# Patient Record
Sex: FEMALE | Race: BLACK OR AFRICAN AMERICAN | Employment: FULL TIME | URBAN - METROPOLITAN AREA
[De-identification: names, ages, dates, MRNs, and addresses within clinical notes are randomized per-mention and may not be internally consistent; named-entity substitution may affect disease eponyms.]

---

## 2018-08-30 ENCOUNTER — APPOINTMENT (OUTPATIENT)
Dept: ULTRASOUND IMAGING | Age: 33
End: 2018-08-30
Payer: COMMERCIAL

## 2018-08-30 ENCOUNTER — HOSPITAL ENCOUNTER (EMERGENCY)
Age: 33
Discharge: HOME | End: 2018-08-31
Payer: COMMERCIAL

## 2018-08-30 VITALS
OXYGEN SATURATION: 99 % | BODY MASS INDEX: 20.98 KG/M2 | HEIGHT: 62 IN | SYSTOLIC BLOOD PRESSURE: 123 MMHG | TEMPERATURE: 98 F | RESPIRATION RATE: 16 BRPM | DIASTOLIC BLOOD PRESSURE: 86 MMHG | WEIGHT: 114 LBS | HEART RATE: 75 BPM

## 2018-08-30 DIAGNOSIS — R10.9 ABDOMINAL PAIN IN PREGNANCY, FIRST TRIMESTER: Primary | ICD-10-CM

## 2018-08-30 DIAGNOSIS — O26.891 ABDOMINAL PAIN IN PREGNANCY, FIRST TRIMESTER: Primary | ICD-10-CM

## 2018-08-30 LAB
ALBUMIN SERPL-MCNC: 3.6 G/DL (ref 3.5–5.2)
ALP BLD-CCNC: 37 U/L (ref 35–104)
ALT SERPL-CCNC: 13 U/L (ref 0–32)
ANION GAP SERPL CALCULATED.3IONS-SCNC: 9 MMOL/L (ref 7–16)
AST SERPL-CCNC: 10 U/L (ref 0–31)
BACTERIA: ABNORMAL /HPF
BASOPHILS ABSOLUTE: 0.04 E9/L (ref 0–0.2)
BASOPHILS RELATIVE PERCENT: 0.5 % (ref 0–2)
BILIRUB SERPL-MCNC: 0.3 MG/DL (ref 0–1.2)
BILIRUBIN URINE: NEGATIVE
BLOOD, URINE: ABNORMAL
BUN BLDV-MCNC: 6 MG/DL (ref 6–20)
CALCIUM SERPL-MCNC: 8.2 MG/DL (ref 8.6–10.2)
CHLORIDE BLD-SCNC: 103 MMOL/L (ref 98–107)
CHP ED QC CHECK: YES
CLARITY: CLEAR
CO2: 24 MMOL/L (ref 22–29)
COLOR: YELLOW
CREAT SERPL-MCNC: 0.6 MG/DL (ref 0.5–1)
EOSINOPHILS ABSOLUTE: 0.09 E9/L (ref 0.05–0.5)
EOSINOPHILS RELATIVE PERCENT: 1 % (ref 0–6)
GFR AFRICAN AMERICAN: >60
GFR NON-AFRICAN AMERICAN: >60 ML/MIN/1.73
GLUCOSE BLD-MCNC: 92 MG/DL (ref 74–109)
GLUCOSE URINE: NEGATIVE MG/DL
HCT VFR BLD CALC: 34.6 % (ref 34–48)
HEMOGLOBIN: 11.7 G/DL (ref 11.5–15.5)
IMMATURE GRANULOCYTES #: 0.04 E9/L
IMMATURE GRANULOCYTES %: 0.5 % (ref 0–5)
KETONES, URINE: NEGATIVE MG/DL
LEUKOCYTE ESTERASE, URINE: NEGATIVE
LYMPHOCYTES ABSOLUTE: 3.03 E9/L (ref 1.5–4)
LYMPHOCYTES RELATIVE PERCENT: 34.4 % (ref 20–42)
MCH RBC QN AUTO: 30.7 PG (ref 26–35)
MCHC RBC AUTO-ENTMCNC: 33.8 % (ref 32–34.5)
MCV RBC AUTO: 90.8 FL (ref 80–99.9)
MONOCYTES ABSOLUTE: 0.57 E9/L (ref 0.1–0.95)
MONOCYTES RELATIVE PERCENT: 6.5 % (ref 2–12)
NEUTROPHILS ABSOLUTE: 5.03 E9/L (ref 1.8–7.3)
NEUTROPHILS RELATIVE PERCENT: 57.1 % (ref 43–80)
NITRITE, URINE: NEGATIVE
PDW BLD-RTO: 12.8 FL (ref 11.5–15)
PH UA: 6.5 (ref 5–9)
PLATELET # BLD: 177 E9/L (ref 130–450)
PMV BLD AUTO: 10.1 FL (ref 7–12)
POTASSIUM SERPL-SCNC: 4.2 MMOL/L (ref 3.5–5)
PREGNANCY TEST URINE, POC: POSITIVE
PROTEIN UA: NEGATIVE MG/DL
RBC # BLD: 3.81 E12/L (ref 3.5–5.5)
RBC UA: ABNORMAL /HPF (ref 0–2)
SODIUM BLD-SCNC: 136 MMOL/L (ref 132–146)
SPECIFIC GRAVITY UA: 1.01 (ref 1–1.03)
TOTAL PROTEIN: 6.1 G/DL (ref 6.4–8.3)
UROBILINOGEN, URINE: 0.2 E.U./DL
WBC # BLD: 8.8 E9/L (ref 4.5–11.5)
WBC UA: ABNORMAL /HPF (ref 0–5)

## 2018-08-30 PROCEDURE — 76817 TRANSVAGINAL US OBSTETRIC: CPT

## 2018-08-30 PROCEDURE — 80053 COMPREHEN METABOLIC PANEL: CPT

## 2018-08-30 PROCEDURE — 36415 COLL VENOUS BLD VENIPUNCTURE: CPT

## 2018-08-30 PROCEDURE — 81001 URINALYSIS AUTO W/SCOPE: CPT

## 2018-08-30 PROCEDURE — 2580000003 HC RX 258: Performed by: NURSE PRACTITIONER

## 2018-08-30 PROCEDURE — 85025 COMPLETE CBC W/AUTO DIFF WBC: CPT

## 2018-08-30 PROCEDURE — 99284 EMERGENCY DEPT VISIT MOD MDM: CPT

## 2018-08-30 RX ORDER — 0.9 % SODIUM CHLORIDE 0.9 %
1000 INTRAVENOUS SOLUTION INTRAVENOUS ONCE
Status: COMPLETED | OUTPATIENT
Start: 2018-08-30 | End: 2018-08-31

## 2018-08-30 RX ADMIN — SODIUM CHLORIDE 1000 ML: 9 INJECTION, SOLUTION INTRAVENOUS at 21:55

## 2018-08-30 ASSESSMENT — PAIN DESCRIPTION - LOCATION: LOCATION: ABDOMEN

## 2018-08-30 ASSESSMENT — PAIN DESCRIPTION - PAIN TYPE: TYPE: ACUTE PAIN

## 2018-08-30 ASSESSMENT — PAIN SCALES - GENERAL: PAINLEVEL_OUTOF10: 2

## 2018-08-31 NOTE — ED PROVIDER NOTES
[facility]     HPI:  8/30/18,   Time: 9:26 PM         Sun Li is a 28 y.o. female presenting to the ED for Nausea and abdominal cramping, beginning Earlier today ago. The complaint has been constant, mild in severity, and worsened by nothing. States that she is g8, p4, a3, follows with  Dr. Desiree Govea, complaining of excessive nausea and vomiting unable to keep anything down. Denies any abdominal pain or vaginal bleeding spotting, But states she does have some abdominal cramping. She has not been seen by ObGyn at this point however does have an upcoming appointment in 2 weeks. She does have Phenergan at home which she has been taken for nausea but with minimal improvement. ROS:   Pertinent positives and negatives are stated within HPI, all other systems reviewed and are negative.  --------------------------------------------- PAST HISTORY ---------------------------------------------  Past Medical History:  has no past medical history on file. Past Surgical History:  has a past surgical history that includes Dilation & curettage and Tonsillectomy and adenoidectomy. Social History:  reports that she quit smoking 8 days ago. She has never used smokeless tobacco. She reports that she does not drink alcohol or use drugs. Family History: family history includes Breast Cancer in her paternal grandmother; Hypertension in her father. The patients home medications have been reviewed. Allergies: Patient has no known allergies.     -------------------------------------------------- RESULTS -------------------------------------------------  All laboratory and radiology results have been personally reviewed by myself   LABS:  Results for orders placed or performed during the hospital encounter of 08/30/18   CBC Auto Differential   Result Value Ref Range    WBC 8.8 4.5 - 11.5 E9/L    RBC 3.81 3.50 - 5.50 E12/L    Hemoglobin 11.7 11.5 - 15.5 g/dL    Hematocrit 34.6 34.0 - 48.0 %    MCV 90.8 80.0 Final Result   1. Single, live intrauterine pregnancy with a crown-rump length compatible    with a 7 week, 3 day gestation. The mean sac diameter is compatible with an 8    week gestation. 2.  The right ovary is not visualized. 3.  No evidence of left ovarian torsion. This report has been electronically signed by Nilton Tom MD.          ------------------------- NURSING NOTES AND VITALS REVIEWED ---------------------------   The nursing notes within the ED encounter and vital signs as below have been reviewed. /86   Pulse 75   Temp 98 °F (36.7 °C)   Resp 16   Ht 5' 2\" (1.575 m)   Wt 114 lb (51.7 kg)   LMP 07/11/2018   SpO2 99%   BMI 20.85 kg/m²   Oxygen Saturation Interpretation: Normal      ---------------------------------------------------PHYSICAL EXAM--------------------------------------      Constitutional/General: Alert and oriented x3, well appearing, non toxic in NAD  Head: NC/AT  Eyes: PERRL, EOMI  Mouth: Oropharynx clear, handling secretions, no trismus  Neck: Supple, full ROM, no meningeal signs  Pulmonary: Lungs clear to auscultation bilaterally, no wheezes, rales, or rhonchi. Not in respiratory distress  Cardiovascular:  Regular rate and rhythm, no murmurs, gallops, or rubs. 2+ distal pulses  Abdomen: Soft, non tender, non distended,   Extremities: Moves all extremities x 4. Warm and well perfused  Skin: warm and dry without rash  Neurologic: GCS 15,  Psych: Normal Affect      ------------------------------ ED COURSE/MEDICAL DECISION MAKING----------------------  Medications   0.9 % sodium chloride bolus (0 mLs Intravenous Stopped 8/31/18 0002)         Medical Decision Making:    No further episodes of vomiting while in the department. She was informed of ultrasound results in normal lab work. States she feels better after IV fluids were given. Instructed to follow up with ObGyn if any change or any    Counseling:    The emergency provider has spoken with the patient and discussed todays results, in addition to providing specific details for the plan of care and counseling regarding the diagnosis and prognosis. Questions are answered at this time and they are agreeable with the plan.      --------------------------------- IMPRESSION AND DISPOSITION ---------------------------------    IMPRESSION  1.  Abdominal pain in pregnancy, first trimester        DISPOSITION  Disposition: Discharge to home  Patient condition is good                 SHAW Echeverria - GREG  08/31/18 4434

## 2019-03-21 ENCOUNTER — HOSPITAL ENCOUNTER (OUTPATIENT)
Age: 34
Discharge: HOME | DRG: 560 | End: 2019-03-21
Attending: OBSTETRICS & GYNECOLOGY | Admitting: OBSTETRICS & GYNECOLOGY
Payer: COMMERCIAL

## 2019-03-21 VITALS
DIASTOLIC BLOOD PRESSURE: 77 MMHG | SYSTOLIC BLOOD PRESSURE: 127 MMHG | HEART RATE: 70 BPM | RESPIRATION RATE: 16 BRPM | TEMPERATURE: 98 F

## 2019-03-21 PROBLEM — Z64.1 MULTIPARITY: Status: ACTIVE | Noted: 2019-03-21

## 2019-03-21 PROCEDURE — 99201 HC NEW PT, OUTPT VISIT LEVEL 1: CPT

## 2019-03-21 NOTE — H&P
Department of Obstetrics and Gynecology  Attending Obstetrics History and Physical      HISTORY OF PRESENT ILLNESS:      The patient is a 28 y.o.  8 parity 4 at 36 weeks 1 days. Complaining of ctx's  Every 15 min. Denies prenatal problems. Estimated Due Date:  3/21/19  Contractions:  Yes  Leaking of fluid: no  nfm  Blleeding:  No    PRENATAL CARE:    Complications: No      Active Problems:    * No active hospital problems. *  Resolved Problems:    * No resolved hospital problems. *        PAST OB HISTORY    OB History    Para Term  AB Living   6 4 4   1 4   SAB TAB Ectopic Molar Multiple Live Births   1         4      # Outcome Date GA Lbr Juan/2nd Weight Sex Delivery Anes PTL Lv   6 Current            5 Term 13 38w0d  5 lb 4 oz (2.381 kg) M Vag-Spont   LUIS ALBERTO   4 Term 10/08/09    F Vag-Spont EPI N LUIS ALBERTO      Birth Comments: Sickle Cell Trait   3 Term 07   7 lb 8 oz (3.402 kg) M Vag-Spont EPI N LUIS ALBERTO      Birth Comments: Bilateral clubbed feet   2 Term 05   8 lb 15 oz (4.054 kg) M Vag-Spont EPI N LUIS ALBERTO   1 SAB 2004                   Pre-eclampsia:  No      :  No      D & C:  Yes       Cerclage:  No      LEEP:  No      Myomectomy:  No       Labor: No    Past Medical History:    No past medical history on file. Past Surgical History:    Past Surgical History:   Procedure Laterality Date    DILATION AND CURETTAGE      TONSILLECTOMY AND ADENOIDECTOMY          Past Family History:  Family History   Problem Relation Age of Onset    Breast Cancer Paternal Grandmother     Hypertension Father        ROS:  Const: No fever, chills, night sweats, no recent unexplained weight gain/loss  HEENT: No blurred vision, double vision; no ear problems; no sore throat, congestion; no running nose. Resp: No cough, no sputum, no pleuritic chest pain, no sob  Cardio: No chest pain, no exertional dyspnea, no PND, no orthopnea, no palpitation, no leg swelling.    GI: No dysphagia, no

## 2019-03-21 NOTE — PROGRESS NOTES
Discharge instructions given to patient. Patient states that she verbally understands instructions. Patient educated on  labor signs and when to return to unit. Patient has no questions at this time. Patient ambulated off floor with son.

## 2019-03-21 NOTE — PROGRESS NOTES
19 36.1  Prev vag delivery. Presents with complaints of pressure and back pain. Contractions q 15 min. Was seen in dr Whit Simmons office this week and was 1 cm. Contractions/ pressure started around 0300.

## 2019-03-22 ENCOUNTER — ANESTHESIA EVENT (OUTPATIENT)
Dept: LABOR AND DELIVERY | Age: 34
DRG: 560 | End: 2019-03-22
Payer: COMMERCIAL

## 2019-03-22 ENCOUNTER — HOSPITAL ENCOUNTER (INPATIENT)
Age: 34
LOS: 2 days | Discharge: HOME | DRG: 560 | End: 2019-03-24
Attending: OBSTETRICS & GYNECOLOGY | Admitting: OBSTETRICS & GYNECOLOGY
Payer: COMMERCIAL

## 2019-03-22 ENCOUNTER — ANESTHESIA (OUTPATIENT)
Dept: LABOR AND DELIVERY | Age: 34
DRG: 560 | End: 2019-03-22
Payer: COMMERCIAL

## 2019-03-22 PROBLEM — Z3A.37 37 WEEKS GESTATION OF PREGNANCY: Status: ACTIVE | Noted: 2019-03-22

## 2019-03-22 LAB
ABO/RH: NORMAL
AMPHETAMINE SCREEN, URINE: NOT DETECTED
ANTIBODY SCREEN: NORMAL
BARBITURATE SCREEN URINE: NOT DETECTED
BENZODIAZEPINE SCREEN, URINE: NOT DETECTED
CANNABINOID SCREEN URINE: POSITIVE
COCAINE METABOLITE SCREEN URINE: NOT DETECTED
HCT VFR BLD CALC: 31.1 % (ref 34–48)
HEMOGLOBIN: 10.5 G/DL (ref 11.5–15.5)
MCH RBC QN AUTO: 32.1 PG (ref 26–35)
MCHC RBC AUTO-ENTMCNC: 33.8 % (ref 32–34.5)
MCV RBC AUTO: 95.1 FL (ref 80–99.9)
METHADONE SCREEN, URINE: NOT DETECTED
OPIATE SCREEN URINE: NOT DETECTED
PDW BLD-RTO: 12.7 FL (ref 11.5–15)
PHENCYCLIDINE SCREEN URINE: NOT DETECTED
PLATELET # BLD: 155 E9/L (ref 130–450)
PMV BLD AUTO: 11.4 FL (ref 7–12)
PROPOXYPHENE SCREEN: NOT DETECTED
RBC # BLD: 3.27 E12/L (ref 3.5–5.5)
WBC # BLD: 9 E9/L (ref 4.5–11.5)

## 2019-03-22 PROCEDURE — 86900 BLOOD TYPING SEROLOGIC ABO: CPT

## 2019-03-22 PROCEDURE — 86901 BLOOD TYPING SEROLOGIC RH(D): CPT

## 2019-03-22 PROCEDURE — 36415 COLL VENOUS BLD VENIPUNCTURE: CPT

## 2019-03-22 PROCEDURE — 86850 RBC ANTIBODY SCREEN: CPT

## 2019-03-22 PROCEDURE — 1220000001 HC SEMI PRIVATE L&D R&B

## 2019-03-22 PROCEDURE — 85027 COMPLETE CBC AUTOMATED: CPT

## 2019-03-22 PROCEDURE — 6360000002 HC RX W HCPCS: Performed by: OBSTETRICS & GYNECOLOGY

## 2019-03-22 PROCEDURE — 80307 DRUG TEST PRSMV CHEM ANLYZR: CPT

## 2019-03-22 PROCEDURE — G0480 DRUG TEST DEF 1-7 CLASSES: HCPCS

## 2019-03-22 PROCEDURE — 2580000003 HC RX 258: Performed by: OBSTETRICS & GYNECOLOGY

## 2019-03-22 RX ORDER — SODIUM CHLORIDE 0.9 % (FLUSH) 0.9 %
10 SYRINGE (ML) INJECTION EVERY 12 HOURS SCHEDULED
Status: DISCONTINUED | OUTPATIENT
Start: 2019-03-22 | End: 2019-03-23 | Stop reason: HOSPADM

## 2019-03-22 RX ORDER — SODIUM CHLORIDE, SODIUM LACTATE, POTASSIUM CHLORIDE, CALCIUM CHLORIDE 600; 310; 30; 20 MG/100ML; MG/100ML; MG/100ML; MG/100ML
INJECTION, SOLUTION INTRAVENOUS CONTINUOUS
Status: DISCONTINUED | OUTPATIENT
Start: 2019-03-22 | End: 2019-03-24 | Stop reason: HOSPADM

## 2019-03-22 RX ORDER — SODIUM CHLORIDE 0.9 % (FLUSH) 0.9 %
10 SYRINGE (ML) INJECTION PRN
Status: DISCONTINUED | OUTPATIENT
Start: 2019-03-22 | End: 2019-03-23 | Stop reason: HOSPADM

## 2019-03-22 RX ADMIN — Medication 1 MILLI-UNITS/MIN: at 20:00

## 2019-03-22 RX ADMIN — SODIUM CHLORIDE, POTASSIUM CHLORIDE, SODIUM LACTATE AND CALCIUM CHLORIDE: 600; 310; 30; 20 INJECTION, SOLUTION INTRAVENOUS at 19:10

## 2019-03-22 ASSESSMENT — LIFESTYLE VARIABLES: SMOKING_STATUS: 0

## 2019-03-22 NOTE — H&P
Department of Obstetrics and Gynecology  Nurse Practitioner Obstetrics History and Physical        CHIEF COMPLAINT:  Contractions    HISTORY OF PRESENT ILLNESS:    The patient is a 28 y.o. female [id number], Patient's last menstrual period was 2018.,  at 37w2d. Here for induction of labor for IUGR. Pregnancy otherwise uncomplicated, history of previous child with club feet. Reports good FM, denies bleeding, LOF or contractions. GBS negative  OB History        8    Para   4    Term   4            AB   3    Living   4       SAB   1    TAB        Ectopic        Molar        Multiple        Live Births   4                Estimated Due Date: Estimated Date of Delivery: 4/10/19    PRENATAL CARE:     Complicated by:   Patient Active Problem List   Diagnosis Code    Hereditary disease in family possibly affecting fetus,( previous baby with Clubbed feet) affecting management of mother, antepartum condition or complication M48. 2XX0    Poor fetal growth, affecting management of mother, antepartum condition or complication Y81.7612    Multiparity Z64.1    37 weeks gestation of pregnancy Z3A.37       PAST OB HISTORY  OB History        8    Para   4    Term   4            AB   3    Living   4       SAB   1    TAB        Ectopic        Molar        Multiple        Live Births   4                Past Medical History:    History reviewed. No pertinent past medical history. Past Surgical History:        Procedure Laterality Date    DILATION AND CURETTAGE      TONSILLECTOMY AND ADENOIDECTOMY       Social History:    TOBACCO:   reports that she quit smoking about 6 months ago. She has never used smokeless tobacco.  ETOH:   reports that she does not drink alcohol. DRUGS:   reports that she does not use drugs.   Family History:       Problem Relation Age of Onset    Breast Cancer Paternal Grandmother     Hypertension Father      Medications Prior to Admission:  Medications Prior to Admission:

## 2019-03-22 NOTE — PROGRESS NOTES
Pt presents to l&d for IOL d/t lagging fetal growth. She is a  with history of 2 elective abortions and 1 miscarriage. EDC is 4/10/19. She states feeling occasional dewayne valencai but denies any leaking of fluid or vaginal bleeding. Perceives good fetal movement. EFM applied. BPs cycling.

## 2019-03-23 PROCEDURE — 6360000002 HC RX W HCPCS: Performed by: ANESTHESIOLOGY

## 2019-03-23 PROCEDURE — 1220000000 HC SEMI PRIVATE OB R&B

## 2019-03-23 PROCEDURE — 3700000025 EPIDURAL BLOCK: Performed by: ANESTHESIOLOGY

## 2019-03-23 PROCEDURE — 7200000001 HC VAGINAL DELIVERY

## 2019-03-23 PROCEDURE — 6370000000 HC RX 637 (ALT 250 FOR IP): Performed by: OBSTETRICS & GYNECOLOGY

## 2019-03-23 PROCEDURE — 51701 INSERT BLADDER CATHETER: CPT

## 2019-03-23 PROCEDURE — 2500000003 HC RX 250 WO HCPCS: Performed by: ANESTHESIOLOGY

## 2019-03-23 RX ORDER — ONDANSETRON 2 MG/ML
4 INJECTION INTRAMUSCULAR; INTRAVENOUS EVERY 6 HOURS PRN
Status: DISCONTINUED | OUTPATIENT
Start: 2019-03-23 | End: 2019-03-23 | Stop reason: HOSPADM

## 2019-03-23 RX ORDER — ACETAMINOPHEN 650 MG
TABLET, EXTENDED RELEASE ORAL
Status: COMPLETED
Start: 2019-03-23 | End: 2019-03-23

## 2019-03-23 RX ORDER — SODIUM CHLORIDE 0.9 % (FLUSH) 0.9 %
10 SYRINGE (ML) INJECTION PRN
Status: DISCONTINUED | OUTPATIENT
Start: 2019-03-23 | End: 2019-03-24 | Stop reason: HOSPADM

## 2019-03-23 RX ORDER — ACETAMINOPHEN 325 MG/1
650 TABLET ORAL EVERY 4 HOURS PRN
Status: DISCONTINUED | OUTPATIENT
Start: 2019-03-23 | End: 2019-03-24 | Stop reason: HOSPADM

## 2019-03-23 RX ORDER — NALBUPHINE HCL 10 MG/ML
5 AMPUL (ML) INJECTION EVERY 4 HOURS PRN
Status: DISCONTINUED | OUTPATIENT
Start: 2019-03-23 | End: 2019-03-23 | Stop reason: HOSPADM

## 2019-03-23 RX ORDER — IBUPROFEN 800 MG/1
800 TABLET ORAL EVERY 8 HOURS
Status: DISCONTINUED | OUTPATIENT
Start: 2019-03-23 | End: 2019-03-24 | Stop reason: HOSPADM

## 2019-03-23 RX ORDER — LIDOCAINE HYDROCHLORIDE 10 MG/ML
INJECTION, SOLUTION INFILTRATION; PERINEURAL
Status: COMPLETED
Start: 2019-03-23 | End: 2019-03-23

## 2019-03-23 RX ORDER — NALOXONE HYDROCHLORIDE 0.4 MG/ML
0.4 INJECTION, SOLUTION INTRAMUSCULAR; INTRAVENOUS; SUBCUTANEOUS PRN
Status: DISCONTINUED | OUTPATIENT
Start: 2019-03-23 | End: 2019-03-23 | Stop reason: HOSPADM

## 2019-03-23 RX ORDER — DOCUSATE SODIUM 100 MG/1
100 CAPSULE, LIQUID FILLED ORAL 2 TIMES DAILY
Status: DISCONTINUED | OUTPATIENT
Start: 2019-03-23 | End: 2019-03-24 | Stop reason: HOSPADM

## 2019-03-23 RX ORDER — SODIUM CHLORIDE 0.9 % (FLUSH) 0.9 %
10 SYRINGE (ML) INJECTION EVERY 12 HOURS SCHEDULED
Status: DISCONTINUED | OUTPATIENT
Start: 2019-03-23 | End: 2019-03-24 | Stop reason: HOSPADM

## 2019-03-23 RX ORDER — SODIUM CHLORIDE, SODIUM LACTATE, POTASSIUM CHLORIDE, CALCIUM CHLORIDE 600; 310; 30; 20 MG/100ML; MG/100ML; MG/100ML; MG/100ML
INJECTION, SOLUTION INTRAVENOUS CONTINUOUS
Status: DISCONTINUED | OUTPATIENT
Start: 2019-03-23 | End: 2019-03-24 | Stop reason: HOSPADM

## 2019-03-23 RX ORDER — LANOLIN 100 %
OINTMENT (GRAM) TOPICAL PRN
Status: DISCONTINUED | OUTPATIENT
Start: 2019-03-23 | End: 2019-03-24 | Stop reason: HOSPADM

## 2019-03-23 RX ADMIN — ONDANSETRON 4 MG: 2 INJECTION INTRAMUSCULAR; INTRAVENOUS at 03:57

## 2019-03-23 RX ADMIN — ACETAMINOPHEN 650 MG: 325 TABLET ORAL at 17:14

## 2019-03-23 RX ADMIN — Medication 10 ML: at 04:07

## 2019-03-23 RX ADMIN — Medication 15 ML/HR: at 04:11

## 2019-03-23 RX ADMIN — IBUPROFEN 800 MG: 800 TABLET, FILM COATED ORAL at 20:36

## 2019-03-23 RX ADMIN — DOCUSATE SODIUM 100 MG: 100 CAPSULE, LIQUID FILLED ORAL at 20:37

## 2019-03-23 RX ADMIN — Medication: at 09:34

## 2019-03-23 ASSESSMENT — PAIN DESCRIPTION - RADICULAR PAIN
RADICULAR_PAIN: ABSENT
RADICULAR_PAIN: ABSENT

## 2019-03-23 ASSESSMENT — PAIN SCALES - GENERAL
PAINLEVEL_OUTOF10: 4
PAINLEVEL_OUTOF10: 10
PAINLEVEL_OUTOF10: 3

## 2019-03-23 NOTE — FLOWSHEET NOTE
Admitted to room 307 from L&D via wheelchair with infant & accompanied by RN from L&D; oriented to call light at bedside, RN's cell number, 's orders, need to call for help with any problems or questions, ordering meals, & infant safety & security. Mother agreed to Hepatitis B vaccine for infant, side rails up x2. Pt is not sure if she wants the TdaP or Flu.

## 2019-03-23 NOTE — PROGRESS NOTES
Dr. Manuelito Carballo called and updated on cervical exam of 3 cm.  New orders to rupture membranes

## 2019-03-23 NOTE — PROGRESS NOTES
Called Dr. Abby Hines on update with VE and pt is a stretchy 8 cm, stated they will be here shortly for delivery.

## 2019-03-23 NOTE — CARE COORDINATION
Social Work:    Social service met with Prem Cobb today due to a positive marijuana screening. She delivered her son, Diann Nunes Jr.vaginally yesterday. Prem Cobb is an active patient of Dr. Luiz Cobb and advises that [facility] will have pediatric care from Dr. Mary Hutchins. Prem Cobb resides with [place], and her four children; 15year old Abigail Burn, 6year old [address] [facility], [phone] old [address], and her 11year old son Geraldine Fitzpatrick. The children all have different father's and only Carolyn and Samuel have active participation from their fathers. Prem Cobb has a mom overseas that she does not have much contact with. Her father resides in [place] and she has some contact with him. Prem Cobb states she has a sister in [place]. but they do not talk much and a brother that lives locally that she has contact with. Prem Cobb has support from [place] and his father, as well has grandmother, and friends. She is employed at [employer] but will be going on maturnity leave. [facility] is presently seeking another job and is present unemployed. Shashank Xiao. will be placed on [employer]. Rita states she called [facility] on herself two years ago because she was dealing with a horrible landlord and needed them to intercede and help her get into the home where she resides today. The address to her present home is [address], [place], [address] and she can be reached at [phone]. [facility] can be reached at [phone]. Rita is breast feeding and plans to continue, along with bottle feeding. She received a pack & play from [facility] and is interested in a referral to [facility]. Social work will refer to [facility] on Monday. Rita also has a car-seat and bassinet. She is on [facility] and was provided resources for a crib and other items that may benefit her.      Social service explained to Rita that she tested positive for marijuana and made her aware that a referral to

## 2019-03-23 NOTE — PROGRESS NOTES
Jeff Barahona is a 28 y.o. female patient. Current Facility-Administered Medications   Medication Dose Route Frequency Provider Last Rate Last Dose    naloxone (NARCAN) injection 0.4 mg  0.4 mg Intravenous PRN Irineo Macias MD        nalbuphine (NUBAIN) injection 5 mg  5 mg Intravenous Q4H PRN Irineo Macias MD        ondansetron [facility]) injection 4 mg  4 mg Intravenous Q6H PRN Irineo Macias MD   4 mg at 03/23/19 0357    fentaNYL 1.85mcg/ml and Bupivicaine 0.1% in 0.9% NS 135ml infusion (OB) epidural  15 mL/hr Epidural Continuous Irineo Macias MD 15 mL/hr at 03/23/19 0411 15 mL/hr at 03/23/19 0411    naloxone (NARCAN) injection 0.4 mg  0.4 mg Intravenous PRN Anibal S [place], DO        nalbuphine (NUBAIN) injection 5 mg  5 mg Intravenous Q4H PRN Anibal S [place], DO        ondansetron (ZOFRAN) injection 4 mg  4 mg Intravenous Q6H PRN Anibal S [place], DO        lactated ringers infusion   Intravenous Continuous Anibal S [place],  mL/hr at 03/22/19 1910      sodium chloride flush 0.9 % injection 10 mL  10 mL Intravenous 2 times per day Anibal S [place], DO        sodium chloride flush 0.9 % injection 10 mL  10 mL Intravenous PRN Anibal S [place], DO        oxytocin (PITOCIN) 30 units in 500 mL infusion  1 john-units/min Intravenous Continuous Anibal S [place], DO 16 mL/hr at 03/23/19 0230 16 john-units/min at 03/23/19 0230     No Known Allergies  Active Problems:    37 weeks gestation of pregnancy  Resolved Problems:    * No resolved hospital problems. *    Blood pressure 125/84, pulse 55, temperature 98 °F (36.7 °C), resp. rate 16, height 5' 2\" (1.575 m), weight 146 lb (66.2 kg), last menstrual period 07/11/2018, SpO2 98 %, unknown if currently breastfeeding.     Subjective  Comfortable with epidural, feeling a little pressure left lower abdomen, attempting to use epidural PCA  Objective  FHT's 130 with mod variability, no decels or variables  Contractions not tracing well, toco adjusted, pitocin at 18  Cervix 3/60/-1/anterior/med  AROM clear fluid  Assessment & Plan:  Continue SHAW Koch - MANNIE  3/23/2019

## 2019-03-24 VITALS
WEIGHT: 146 LBS | SYSTOLIC BLOOD PRESSURE: 119 MMHG | TEMPERATURE: 98.6 F | BODY MASS INDEX: 26.87 KG/M2 | RESPIRATION RATE: 16 BRPM | HEART RATE: 62 BPM | HEIGHT: 62 IN | DIASTOLIC BLOOD PRESSURE: 74 MMHG | OXYGEN SATURATION: 98 %

## 2019-03-24 LAB
HCT VFR BLD CALC: 30.5 % (ref 34–48)
HEMOGLOBIN: 10.1 G/DL (ref 11.5–15.5)
MCH RBC QN AUTO: 31.7 PG (ref 26–35)
MCHC RBC AUTO-ENTMCNC: 33.1 % (ref 32–34.5)
MCV RBC AUTO: 95.6 FL (ref 80–99.9)
PDW BLD-RTO: 12.5 FL (ref 11.5–15)
PLATELET # BLD: 146 E9/L (ref 130–450)
PMV BLD AUTO: 11.2 FL (ref 7–12)
RBC # BLD: 3.19 E12/L (ref 3.5–5.5)
WBC # BLD: 10.4 E9/L (ref 4.5–11.5)

## 2019-03-24 PROCEDURE — 36415 COLL VENOUS BLD VENIPUNCTURE: CPT

## 2019-03-24 PROCEDURE — 6370000000 HC RX 637 (ALT 250 FOR IP): Performed by: OBSTETRICS & GYNECOLOGY

## 2019-03-24 PROCEDURE — 85027 COMPLETE CBC AUTOMATED: CPT

## 2019-03-24 RX ADMIN — DOCUSATE SODIUM 100 MG: 100 CAPSULE, LIQUID FILLED ORAL at 09:42

## 2019-03-24 RX ADMIN — IBUPROFEN 800 MG: 800 TABLET, FILM COATED ORAL at 04:24

## 2019-03-24 ASSESSMENT — PAIN SCALES - GENERAL: PAINLEVEL_OUTOF10: 7

## 2019-03-24 NOTE — PROGRESS NOTES
Department of Obstetrics and Gynecology  Labor and Delivery  Attending Post Partum Progress Note      SUBJECTIVE:  Patient without complaints  OBJECTIVE:      Vitals:  BP 85/59  Pulse 83  Temp(Src) 97.4 °F (36.3 °C) (Oral)  Resp 18  Ht 5' 4\" (1.626 m)  Wt 160 lb (72.576 kg)  BMI 27.46 kg/m2  Breastfeeding? Yes    CONSTITUTIONAL:  awake, alert, cooperative, no apparent distress, and appears stated age  ABDOMEN:  Fundus firm and 1 below the umbilicus  CHEST/BREASTS:  Breasts symmetrical,soft and non-tender  MUSCULOSKELETAL: No calf tenderness, 1+ pedal edema.      DATA:    RH:  No results found for: ANATITER, C3, C4, RF  Antibody Screen:  No components found for: ABSCINT  Urine Culture Screen:  No components found for: MILLIE  CBC:    Lab Results   Component Value Date    WBC 10.4 03/24/2019    RBC 3.19 03/24/2019    HGB 10.1 03/24/2019    HCT 30.5 03/24/2019    MCV 95.6 03/24/2019    RDW 12.5 03/24/2019     03/24/2019     U/A:  No components found for: Bevely Zenon, USPGRAV, UPH, UPROTEIN, UGLUCOSE, UKETONE, UBILI, UBLOOD, UNITRITE, UUROBIL, ULEUKEST, USQEPI, [place], UWBC, Manoj, Heriberto, [employer]    ASSESSMENT & PLAN:        Assessment: PP#1 doing well     Plan: Patient for discharge to home today    Becca Alex  5:04 PM

## 2019-03-24 NOTE — FLOWSHEET NOTE
Spoke with Be Morse from [facility] for referral in regards to mothers positive UDS for cannibinoids. Patient and infant are ok to go home from a [facility] stand point.

## 2019-03-24 NOTE — PLAN OF CARE
Problem: Fluid Volume - Imbalance:  Goal: Absence of postpartum hemorrhage signs and symptoms  Description  Absence of postpartum hemorrhage signs and symptoms  Outcome: Met This Shift     Problem: Mood - Altered:  Goal: Mood stable  Description  Mood stable  Outcome: Met This Shift

## 2019-03-24 NOTE — FLOWSHEET NOTE
Pt. Requested a blanket for her 11 yr old son. I informed her it is hospital policy that no one under the age of 25 can spend the night. She stated she had no one to watch him tonight. She would sign herself out if he couldn't stay. I encouraged her to try and find someone to come and pick him up. I notified Anthony Holm clinical manager. Glenny notified Steve Leiva, our director and Dalton Le manager. Julian Ríos came to the floor and spoke with Virgil. Security present. Rita again said she had no one for him to stay with and she would sign herself and them out if he couldn't stay. She was informed the baby couldn't be released tonight. She then got her uncle to  her grandma and they came and got her 11 yr old son.

## 2019-03-28 LAB — CANNABINOIDS CONF, URINE: >500 NG/ML

## 2019-03-30 NOTE — DISCHARGE SUMMARY
Admitted  3/22/19   Vaginal  Delivery 3/23/19   Post  Partum  Unremarkable   \ Discharged  3/24/19  'to home  In  Stable  And  Satis  Condition   No  rx

## 2020-07-14 ENCOUNTER — HOSPITAL ENCOUNTER (EMERGENCY)
Age: 35
Discharge: HOME | End: 2020-07-14
Attending: FAMILY MEDICINE
Payer: COMMERCIAL

## 2020-07-14 VITALS
RESPIRATION RATE: 16 BRPM | TEMPERATURE: 97 F | HEART RATE: 60 BPM | BODY MASS INDEX: 23 KG/M2 | OXYGEN SATURATION: 99 % | WEIGHT: 125 LBS | DIASTOLIC BLOOD PRESSURE: 84 MMHG | SYSTOLIC BLOOD PRESSURE: 129 MMHG | HEIGHT: 62 IN

## 2020-07-14 LAB
AMORPHOUS: ABNORMAL
BACTERIA: ABNORMAL /HPF
BILIRUBIN URINE: NEGATIVE
BLOOD, URINE: ABNORMAL
CLARITY: ABNORMAL
COLOR: YELLOW
EPITHELIAL CELLS, UA: ABNORMAL /HPF
GLUCOSE URINE: NEGATIVE MG/DL
HCG(URINE) PREGNANCY TEST: NEGATIVE
KETONES, URINE: NEGATIVE MG/DL
LEUKOCYTE ESTERASE, URINE: ABNORMAL
NITRITE, URINE: NEGATIVE
PH UA: 7 (ref 5–9)
PROTEIN UA: NEGATIVE MG/DL
RBC UA: ABNORMAL /HPF (ref 0–2)
SPECIFIC GRAVITY UA: 1.02 (ref 1–1.03)
UROBILINOGEN, URINE: 2 E.U./DL
WBC UA: ABNORMAL /HPF (ref 0–5)

## 2020-07-14 PROCEDURE — 81001 URINALYSIS AUTO W/SCOPE: CPT

## 2020-07-14 PROCEDURE — G0382 LEV 3 HOSP TYPE B ED VISIT: HCPCS

## 2020-07-14 PROCEDURE — 81025 URINE PREGNANCY TEST: CPT

## 2020-07-14 PROCEDURE — 87591 N.GONORRHOEAE DNA AMP PROB: CPT

## 2020-07-14 PROCEDURE — 96372 THER/PROPH/DIAG INJ SC/IM: CPT

## 2020-07-14 PROCEDURE — 6360000002 HC RX W HCPCS

## 2020-07-14 PROCEDURE — 87491 CHLMYD TRACH DNA AMP PROBE: CPT

## 2020-07-14 RX ORDER — AZITHROMYCIN 250 MG/1
TABLET, FILM COATED ORAL
Qty: 4 TABLET | Refills: 0 | Status: SHIPPED | OUTPATIENT
Start: 2020-07-14 | End: 2022-09-26

## 2020-07-14 RX ORDER — CEFTRIAXONE SODIUM 250 MG/1
250 INJECTION, POWDER, FOR SOLUTION INTRAMUSCULAR; INTRAVENOUS ONCE
Status: COMPLETED | OUTPATIENT
Start: 2020-07-14 | End: 2020-07-14

## 2020-07-14 RX ORDER — CEFTRIAXONE SODIUM 250 MG/1
INJECTION, POWDER, FOR SOLUTION INTRAMUSCULAR; INTRAVENOUS
Status: COMPLETED
Start: 2020-07-14 | End: 2020-07-14

## 2020-07-14 RX ADMIN — CEFTRIAXONE SODIUM 250 MG: 250 INJECTION, POWDER, FOR SOLUTION INTRAMUSCULAR; INTRAVENOUS at 18:53

## 2020-07-14 NOTE — ED PROVIDER NOTES
HPI:  7/14/20,   Time: 6:42 PM EDT         Bri Acosta is a 27 y.o. female presenting to the ED for exposure to gonorrhea. Her significant other flu which she has sexual intercourse, told her that he tested positive for gonorrhea. She complains of a mild slightly yellow vaginal discharge but she denies dysuria or frequency urgency of urine. She denies vaginal pain or itching. She denies dyspareunia. ROS:   Pertinent positives and negatives are stated within HPI, all other systems reviewed and are negative.  --------------------------------------------- PAST HISTORY ---------------------------------------------  Past Medical History:  has no past medical history on file. Past Surgical History:  has a past surgical history that includes Dilation & curettage and Tonsillectomy and adenoidectomy. Social History:  reports that she quit smoking about 22 months ago. She has never used smokeless tobacco. She reports that she does not drink alcohol or use drugs. Family History: family history includes Breast Cancer in her paternal grandmother; Hypertension in her father. The patients home medications have been reviewed. Allergies: Patient has no known allergies.     -------------------------------------------------- RESULTS -------------------------------------------------  All laboratory and radiology results have been personally reviewed by myself   LABS:  Results for orders placed or performed during the hospital encounter of 07/14/20   C.trachomatis N.gonorrhoeae DNA, Urine    Specimen: Urine   Result Value Ref Range    Source Urine    Urinalysis   Result Value Ref Range    Color, UA Yellow Straw/Yellow    Clarity, UA SL CLOUDY Clear    Glucose, Ur Negative Negative mg/dL    Bilirubin Urine Negative Negative    Ketones, Urine Negative Negative mg/dL    Specific Gravity, UA 1.025 1.005 - 1.030    Blood, Urine TRACE (A) Negative    pH, UA 7.0 5.0 - 9.0    Protein, UA Negative Negative mg/dL    Urobilinogen, Urine 2.0 (A) <2.0 E.U./dL    Nitrite, Urine Negative Negative    Leukocyte Esterase, Urine TRACE (A) Negative   Pregnancy, urine   Result Value Ref Range    HCG(Urine) Pregnancy Test NEGATIVE NEGATIVE   Microscopic Urinalysis   Result Value Ref Range    WBC, UA 1-3 0 - 5 /HPF    RBC, UA 1-3 0 - 2 /HPF    Epithelial Cells, UA FEW /HPF    Bacteria, UA FEW (A) None Seen /HPF    Amorphous, UA FEW        RADIOLOGY:  Interpreted by Radiologist.  No orders to display       ------------------------- NURSING NOTES AND VITALS REVIEWED ---------------------------   The nursing notes within the ED encounter and vital signs as below have been reviewed. /84   Pulse 60   Temp 97 °F (36.1 °C) (Temporal)   Resp 16   Ht 5' 2\" (1.575 m)   Wt 125 lb (56.7 kg)   SpO2 99%   BMI 22.86 kg/m²   Oxygen Saturation Interpretation: Normal      ---------------------------------------------------PHYSICAL EXAM--------------------------------------    Constitutional/General: Alert and oriented x3, well appearing, non toxic in NAD  Head: NC/AT  Eyes: PERRL, EOMI  Mouth: Oropharynx clear, handling secretions, no trismus  Neck: Supple, full ROM, no meningeal signs  Pulmonary: Lungs clear to auscultation bilaterally, no wheezes, rales, or rhonchi. Not in respiratory distress  Cardiovascular:  Regular rate and rhythm, no murmurs, gallops, or rubs. 2+ distal pulses  Abdomen: Soft, non tender, non distended,   Extremities: Moves all extremities x 4. Warm and well perfused  Skin: warm and dry without rash  Neurologic: GCS 15,  Psych: Normal Affect      ------------------------------ ED COURSE/MEDICAL DECISION MAKING----------------------  Medications   cefTRIAXone (ROCEPHIN) 250 MG injection (has no administration in time range)   cefTRIAXone (ROCEPHIN) injection 250 mg (has no administration in time range)         Medical Decision Making:    Simple    Counseling:    The emergency provider has spoken with the patient and discussed todays results, in addition to providing specific details for the plan of care and counseling regarding the diagnosis and prognosis. Questions are answered at this time and they are agreeable with the plan.      --------------------------------- IMPRESSION AND DISPOSITION ---------------------------------    IMPRESSION  1.  STD exposure        DISPOSITION  Disposition: Discharge to home  Patient condition is stable                 Rajiv Bach MD  07/14/20 [id number]

## 2020-07-17 LAB
C. TRACHOMATIS DNA ,URINE: NEGATIVE
N. GONORRHOEAE DNA, URINE: ABNORMAL
SOURCE: ABNORMAL

## 2022-08-16 ENCOUNTER — HOSPITAL ENCOUNTER (EMERGENCY)
Age: 37
Discharge: HOME | End: 2022-08-16
Attending: EMERGENCY MEDICINE
Payer: MEDICAID

## 2022-08-16 VITALS
WEIGHT: 125 LBS | SYSTOLIC BLOOD PRESSURE: 139 MMHG | TEMPERATURE: 99.1 F | OXYGEN SATURATION: 100 % | BODY MASS INDEX: 22.86 KG/M2 | DIASTOLIC BLOOD PRESSURE: 82 MMHG | RESPIRATION RATE: 16 BRPM | HEART RATE: 60 BPM

## 2022-08-16 DIAGNOSIS — O21.9 NAUSEA AND VOMITING IN PREGNANCY: Primary | ICD-10-CM

## 2022-08-16 DIAGNOSIS — R74.01 TRANSAMINITIS: ICD-10-CM

## 2022-08-16 LAB
ALBUMIN SERPL-MCNC: 3.7 G/DL (ref 3.5–5.2)
ALP BLD-CCNC: 50 U/L (ref 35–104)
ALT SERPL-CCNC: 60 U/L (ref 0–32)
ANION GAP SERPL CALCULATED.3IONS-SCNC: 10 MMOL/L (ref 7–16)
AST SERPL-CCNC: 32 U/L (ref 0–31)
BACTERIA: ABNORMAL /HPF
BASOPHILS ABSOLUTE: 0.02 E9/L (ref 0–0.2)
BASOPHILS RELATIVE PERCENT: 0.2 % (ref 0–2)
BILIRUB SERPL-MCNC: 0.3 MG/DL (ref 0–1.2)
BILIRUBIN URINE: NEGATIVE
BLOOD, URINE: ABNORMAL
BUN BLDV-MCNC: 8 MG/DL (ref 6–20)
CALCIUM SERPL-MCNC: 8.2 MG/DL (ref 8.6–10.2)
CHLORIDE BLD-SCNC: 105 MMOL/L (ref 98–107)
CLARITY: CLEAR
CO2: 24 MMOL/L (ref 22–29)
COLOR: YELLOW
CREAT SERPL-MCNC: 0.5 MG/DL (ref 0.5–1)
EOSINOPHILS ABSOLUTE: 0.02 E9/L (ref 0.05–0.5)
EOSINOPHILS RELATIVE PERCENT: 0.2 % (ref 0–6)
EPITHELIAL CELLS, UA: ABNORMAL /HPF
GFR AFRICAN AMERICAN: >60
GFR NON-AFRICAN AMERICAN: >60 ML/MIN/1.73
GLUCOSE BLD-MCNC: 78 MG/DL (ref 74–99)
GLUCOSE URINE: NEGATIVE MG/DL
HCT VFR BLD CALC: 30.6 % (ref 34–48)
HEMOGLOBIN: 10.1 G/DL (ref 11.5–15.5)
IMMATURE GRANULOCYTES #: 0.1 E9/L
IMMATURE GRANULOCYTES %: 1.2 % (ref 0–5)
KETONES, URINE: >=80 MG/DL
LEUKOCYTE ESTERASE, URINE: NEGATIVE
LIPASE: 17 U/L (ref 13–60)
LYMPHOCYTES ABSOLUTE: 1.52 E9/L (ref 1.5–4)
LYMPHOCYTES RELATIVE PERCENT: 18 % (ref 20–42)
MAGNESIUM: 1.6 MG/DL (ref 1.6–2.6)
MCH RBC QN AUTO: 30.4 PG (ref 26–35)
MCHC RBC AUTO-ENTMCNC: 33 % (ref 32–34.5)
MCV RBC AUTO: 92.2 FL (ref 80–99.9)
MONOCYTES ABSOLUTE: 0.41 E9/L (ref 0.1–0.95)
MONOCYTES RELATIVE PERCENT: 4.9 % (ref 2–12)
MUCUS: PRESENT /LPF
NEUTROPHILS ABSOLUTE: 6.37 E9/L (ref 1.8–7.3)
NEUTROPHILS RELATIVE PERCENT: 75.5 % (ref 43–80)
NITRITE, URINE: NEGATIVE
PDW BLD-RTO: 12.2 FL (ref 11.5–15)
PH UA: 6 (ref 5–9)
PLATELET # BLD: 184 E9/L (ref 130–450)
PMV BLD AUTO: 11.1 FL (ref 7–12)
POTASSIUM REFLEX MAGNESIUM: 3.5 MMOL/L (ref 3.5–5)
PROTEIN UA: ABNORMAL MG/DL
RBC # BLD: 3.32 E12/L (ref 3.5–5.5)
RBC UA: ABNORMAL /HPF (ref 0–2)
SODIUM BLD-SCNC: 139 MMOL/L (ref 132–146)
SPECIFIC GRAVITY UA: 1.02 (ref 1–1.03)
TOTAL PROTEIN: 6.5 G/DL (ref 6.4–8.3)
UROBILINOGEN, URINE: 1 E.U./DL
WBC # BLD: 8.4 E9/L (ref 4.5–11.5)
WBC UA: ABNORMAL /HPF (ref 0–5)

## 2022-08-16 PROCEDURE — 85025 COMPLETE CBC W/AUTO DIFF WBC: CPT

## 2022-08-16 PROCEDURE — 96372 THER/PROPH/DIAG INJ SC/IM: CPT

## 2022-08-16 PROCEDURE — 99284 EMERGENCY DEPT VISIT MOD MDM: CPT

## 2022-08-16 PROCEDURE — 6360000002 HC RX W HCPCS: Performed by: EMERGENCY MEDICINE

## 2022-08-16 PROCEDURE — 2580000003 HC RX 258: Performed by: EMERGENCY MEDICINE

## 2022-08-16 PROCEDURE — 83690 ASSAY OF LIPASE: CPT

## 2022-08-16 PROCEDURE — 87088 URINE BACTERIA CULTURE: CPT

## 2022-08-16 PROCEDURE — 83735 ASSAY OF MAGNESIUM: CPT

## 2022-08-16 PROCEDURE — 80053 COMPREHEN METABOLIC PANEL: CPT

## 2022-08-16 PROCEDURE — 81001 URINALYSIS AUTO W/SCOPE: CPT

## 2022-08-16 RX ORDER — 0.9 % SODIUM CHLORIDE 0.9 %
1000 INTRAVENOUS SOLUTION INTRAVENOUS ONCE
Status: COMPLETED | OUTPATIENT
Start: 2022-08-16 | End: 2022-08-16

## 2022-08-16 RX ORDER — PROMETHAZINE HYDROCHLORIDE 25 MG/ML
25 INJECTION, SOLUTION INTRAMUSCULAR; INTRAVENOUS ONCE
Status: COMPLETED | OUTPATIENT
Start: 2022-08-16 | End: 2022-08-16

## 2022-08-16 RX ORDER — PROMETHAZINE HYDROCHLORIDE 25 MG/1
25 TABLET ORAL EVERY 4 HOURS
Qty: 14 TABLET | Refills: 0 | Status: SHIPPED | OUTPATIENT
Start: 2022-08-16 | End: 2022-08-21

## 2022-08-16 RX ADMIN — PROMETHAZINE HYDROCHLORIDE 25 MG: 25 INJECTION INTRAMUSCULAR; INTRAVENOUS at 16:16

## 2022-08-16 RX ADMIN — SODIUM CHLORIDE 1000 ML: 9 INJECTION, SOLUTION INTRAVENOUS at 16:15

## 2022-08-16 ASSESSMENT — ENCOUNTER SYMPTOMS
SHORTNESS OF BREATH: 0
ABDOMINAL PAIN: 1
DIARRHEA: 1
BLOOD IN STOOL: 0
VOMITING: 1
COUGH: 0
BACK PAIN: 0
RHINORRHEA: 0
COLOR CHANGE: 0
NAUSEA: 1

## 2022-08-16 NOTE — ED NOTES
Pt. C/o abdominal pain and nausea. States they are 22 weeks pregnant.      Neftali Lott RN  08/16/22 [id number]

## 2022-08-16 NOTE — ED PROVIDER NOTES
Patient presents to the ED for evaluation of nausea and vomiting. She states that she is currently 22 weeks pregnant. She has been having nausea during the entire pregnancy. She was initially prescribed vitamin B12 for the nausea but it has not been helping. She just recently moved here from [address] [place] [address] and is going to have to see a new OB/GYN. She is not currently taking anything for nausea. States that she is having difficulty keeping anything down. Denies any abdominal pain at this time. She states she has occasional cramping. Denies any vaginal bleeding or discharge. Denies any dizziness or lightheadedness. She also states over the past week she has had a couple episodes of diarrhea. She was diagnosed with COVID over a week ago. She states that she was vaccinated and she is past the quarantine stage. She is not having any fever or chills. Denies body aches or joint aches. Denies any discomfort urinating. She also states that she has noticed the baby moving normally. Patient's symptoms have been moderate in severity. Worse with eating and drinking. She states she really has not noticed anything that makes her symptoms better. Review of Systems   Constitutional:  Negative for chills, diaphoresis, fatigue and fever. HENT:  Negative for congestion and rhinorrhea. Eyes:  Negative for visual disturbance (no blurred vision). Respiratory:  Negative for cough and shortness of breath. Cardiovascular:  Negative for chest pain and palpitations. Gastrointestinal:  Positive for abdominal pain, diarrhea, nausea and vomiting. Negative for blood in stool. Genitourinary:  Negative for difficulty urinating, dysuria, frequency, hematuria, vaginal bleeding and vaginal discharge. Musculoskeletal:  Negative for arthralgias, back pain and myalgias. Skin:  Negative for color change and pallor. Neurological:  Negative for dizziness, syncope and light-headedness.    Hematological:  Does not bruise/bleed easily. All other systems reviewed and are negative. Physical Exam  Vitals and nursing note reviewed. Constitutional:       General: She is not in acute distress. Appearance: She is well-developed and normal weight. She is not ill-appearing or diaphoretic. HENT:      Head: Normocephalic and atraumatic. Eyes:      General: No scleral icterus. Conjunctiva/sclera: Conjunctivae normal.   Cardiovascular:      Rate and Rhythm: Normal rate and regular rhythm. Heart sounds: Normal heart sounds. No murmur heard. Pulmonary:      Effort: Pulmonary effort is normal. No respiratory distress. Breath sounds: Normal breath sounds. No wheezing or rales. Abdominal:      General: Bowel sounds are normal. There is distension (Gravid uterus). Palpations: Abdomen is soft. Tenderness: There is no abdominal tenderness. There is no guarding or rebound. Musculoskeletal:      Cervical back: Normal range of motion and neck supple. Comments: Patient has no edema of lower extremities   Skin:     General: Skin is warm and dry. Coloration: Skin is not jaundiced or pale. Neurological:      Mental Status: She is alert and oriented to person, place, and time. Coordination: Coordination normal.        Procedures     [facility] Aug 16, 2022   1724 Patient resting in bed in no distress. Discussed results of labs thus far. Her nausea has improved with the Phenergan. Discussed that fetal heart tones were normal. [MS]   1937 Currently drinking and having no emesis. Discussed additional results with her. Urinalysis shows no evidence of infection but does show ketones this is most likely secondary to dehydration and not eating today. She did have a mild bump in her liver enzymes. No evidence of renal insufficiency or electrolyte abnormality. And CBC was unremarkable showing no evidence of leukocytosis or anemia. I will provide her with OB/GYN follow-up.   She understands that in the interim if she has worsening symptoms or new concerns that she can return to the ED for further evaluation. [MS]      ED Course User Index  [MS] Ramo Santillan DO       --------------------------------------------- PAST HISTORY ---------------------------------------------  Past Medical History:  has no past medical history on file. Past Surgical History:  has a past surgical history that includes Dilation & curettage and Tonsillectomy and adenoidectomy. Social History:  reports that she quit smoking about 3 years ago. She has never used smokeless tobacco. She reports that she does not drink alcohol and does not use drugs. Family History: family history includes Breast Cancer in her paternal grandmother; Hypertension in her father. The patients home medications have been reviewed. Allergies: Patient has no known allergies.     -------------------------------------------------- RESULTS -------------------------------------------------  Labs:  Results for orders placed or performed during the hospital encounter of 08/16/22   Urinalysis with Microscopic   Result Value Ref Range    Color, UA Yellow Straw/Yellow    Clarity, UA Clear Clear    Glucose, Ur Negative Negative mg/dL    Bilirubin Urine Negative Negative    Ketones, Urine >=80 (A) Negative mg/dL    Specific Gravity, UA 1.025 1.005 - 1.030    Blood, Urine TRACE (A) Negative    pH, UA 6.0 5.0 - 9.0    Protein, UA TRACE Negative mg/dL    Urobilinogen, Urine 1.0 <2.0 E.U./dL    Nitrite, Urine Negative Negative    Leukocyte Esterase, Urine Negative Negative    Mucus, UA Present (A) None Seen /LPF    WBC, UA 0-1 0 - 5 /HPF    RBC, UA 0-1 0 - 2 /HPF    Epithelial Cells, UA FEW /HPF    Bacteria, UA RARE (A) None Seen /HPF   CBC with Auto Differential   Result Value Ref Range    WBC 8.4 4.5 - 11.5 E9/L    RBC 3.32 (L) 3.50 - 5.50 E12/L    Hemoglobin 10.1 (L) 11.5 - 15.5 g/dL    Hematocrit 30.6 (L) 34.0 - 48.0 %    MCV 92.2 80.0 - 99.9 fL MCH 30.4 26.0 - 35.0 pg    MCHC 33.0 32.0 - 34.5 %    RDW 12.2 11.5 - 15.0 fL    Platelets 760 845 - 304 E9/L    MPV 11.1 7.0 - 12.0 fL    Neutrophils % 75.5 43.0 - 80.0 %    Immature Granulocytes % 1.2 0.0 - 5.0 %    Lymphocytes % 18.0 (L) 20.0 - 42.0 %    Monocytes % 4.9 2.0 - 12.0 %    Eosinophils % 0.2 0.0 - 6.0 %    Basophils % 0.2 0.0 - 2.0 %    Neutrophils Absolute 6.37 1.80 - 7.30 E9/L    Immature Granulocytes # 0.10 E9/L    Lymphocytes Absolute 1.52 1.50 - 4.00 E9/L    Monocytes Absolute 0.41 0.10 - 0.95 E9/L    Eosinophils Absolute 0.02 (L) 0.05 - 0.50 E9/L    Basophils Absolute 0.02 0.00 - 0.20 E9/L   Comprehensive Metabolic Panel w/ Reflex to MG   Result Value Ref Range    Sodium 139 132 - 146 mmol/L    Potassium reflex Magnesium 3.5 3.5 - 5.0 mmol/L    Chloride 105 98 - 107 mmol/L    CO2 24 22 - 29 mmol/L    Anion Gap 10 7 - 16 mmol/L    Glucose 78 74 - 99 mg/dL    BUN 8 6 - 20 mg/dL    Creatinine 0.5 0.5 - 1.0 mg/dL    GFR Non-African American >60 >=60 mL/min/1.73    GFR African American >60     Calcium 8.2 (L) 8.6 - 10.2 mg/dL    Total Protein 6.5 6.4 - 8.3 g/dL    Albumin 3.7 3.5 - 5.2 g/dL    Total Bilirubin 0.3 0.0 - 1.2 mg/dL    Alkaline Phosphatase 50 35 - 104 U/L    ALT 60 (H) 0 - 32 U/L    AST 32 (H) 0 - 31 U/L   Lipase   Result Value Ref Range    Lipase 17 13 - 60 U/L   Magnesium   Result Value Ref Range    Magnesium 1.6 1.6 - 2.6 mg/dL       Radiology:  No orders to display       ------------------------- NURSING NOTES AND VITALS REVIEWED ---------------------------  Date / Time Roomed:  8/16/2022  2:35 PM  ED Bed Assignment:  22/22    The nursing notes within the ED encounter and vital signs as below have been reviewed.    /68   Pulse 69   Temp 98.3 °F (36.8 °C) (Oral)   Resp 19   Wt 125 lb (56.7 kg)   SpO2 98%   BMI 22.86 kg/m²   Oxygen Saturation Interpretation: Normal      ------------------------------------------ PROGRESS NOTES ------------------------------------------  I have spoken with the patient and discussed todays results, in addition to providing specific details for the plan of care and counseling regarding the diagnosis and prognosis. Their questions are answered at this time and they are agreeable with the plan. I discussed at length with them reasons for immediate return here for re evaluation. They will followup with primary care by calling their office tomorrow. --------------------------------- ADDITIONAL PROVIDER NOTES ---------------------------------  At this time the patient is without objective evidence of an acute process requiring hospitalization or inpatient management. They have remained hemodynamically stable throughout their entire ED visit and are stable for discharge with outpatient follow-up. The plan has been discussed in detail and they are aware of the specific conditions for emergent return, as well as the importance of follow-up. New Prescriptions    PROMETHAZINE (PHENERGAN) 25 MG TABLET    Take 1 tablet by mouth every 4 hours for 5 days       Diagnosis:  1. Nausea and vomiting in pregnancy        Disposition:  Patient's disposition: Discharge to home  Patient's condition is stable.          Jose Liz DO  08/16/22 1941

## 2022-08-18 LAB — URINE CULTURE, ROUTINE: NORMAL

## 2022-09-26 ENCOUNTER — ANCILLARY PROCEDURE (OUTPATIENT)
Dept: OBGYN CLINIC | Age: 37
End: 2022-09-26
Payer: MEDICAID

## 2022-09-26 ENCOUNTER — INITIAL PRENATAL (OUTPATIENT)
Dept: OBGYN CLINIC | Age: 37
End: 2022-09-26
Payer: MEDICAID

## 2022-09-26 VITALS
BODY MASS INDEX: 25.79 KG/M2 | SYSTOLIC BLOOD PRESSURE: 123 MMHG | HEART RATE: 77 BPM | DIASTOLIC BLOOD PRESSURE: 87 MMHG | WEIGHT: 141 LBS

## 2022-09-26 DIAGNOSIS — Z34.90 PREGNANCY, UNSPECIFIED GESTATIONAL AGE: Primary | ICD-10-CM

## 2022-09-26 DIAGNOSIS — O09.523 MULTIGRAVIDA OF ADVANCED MATERNAL AGE IN THIRD TRIMESTER: ICD-10-CM

## 2022-09-26 PROCEDURE — 76811 OB US DETAILED SNGL FETUS: CPT | Performed by: OBSTETRICS & GYNECOLOGY

## 2022-09-26 PROCEDURE — 99203 OFFICE O/P NEW LOW 30 MIN: CPT | Performed by: OBSTETRICS & GYNECOLOGY

## 2022-09-26 RX ORDER — METOCLOPRAMIDE 10 MG/1
TABLET ORAL
COMMUNITY
Start: 2022-09-15

## 2022-09-26 RX ORDER — FERROUS SULFATE 325(65) MG
TABLET ORAL
COMMUNITY
Start: 2022-09-15

## 2022-09-26 RX ORDER — DOXYLAMINE SUCCINATE AND PYRIDOXINE HYDROCHLORIDE, DELAYED RELEASE TABLETS 10 MG/10 MG 10; 10 MG/1; MG/1
2 TABLET, DELAYED RELEASE ORAL NIGHTLY
COMMUNITY
Start: 2022-05-02

## 2022-09-26 RX ORDER — LANSOPRAZOLE 15 MG/1
CAPSULE, DELAYED RELEASE ORAL
COMMUNITY
Start: 2022-09-15

## 2022-09-26 RX ORDER — PROGESTERONE 200 MG/1
CAPSULE ORAL
COMMUNITY
Start: 2022-09-15

## 2022-09-26 RX ORDER — BLACK COHOSH ROOT 200 MG
CAPSULE ORAL
COMMUNITY
Start: 2022-09-15

## 2022-09-26 NOTE — LETTER
[facility]  [address]  Phone: [phone]  Fax: [phone]           Waqar Rosado MD      October 1, 2022     Patient: Ricky Li   MR Number: [id number]   YOB: 1985   Date of Visit: 9/26/2022       Dear Dr. Jorge Alex:    Thank you for referring Ricky Li to me for evaluation/treatment. Below are the relevant portions of my assessment and plan of care. If you have questions, please do not hesitate to call me. I look forward to following Rita along with you.     Sincerely,        Waqar Rosado MD    CC providers:  Kirsten Wall MD  [address] [place] [address]  [address]

## 2022-10-01 NOTE — PROGRESS NOTES
[facility] MATERNAL FETAL MEDICINE  [facility]. [address], [place]  Ph: Edvince 55    Fax: [phone]                                                                2022  RE: Ray Macias 83   Dear Dr. Fannie Jose  :       We saw  Ms. Xiomara Qiu    for antepartum testing   in the office at  [place], [place]  on  22       SUMMARY: REASSURING EXAM TODAY. PRECAUTIONS REVIEWED  FOLLOWUP YOUR OFFICE. NEXT [facility] APPT to be arranged PRN   OB History 36yo  9. Para 5 @28w1d     Risk Factors Advanced Maternal Age  Contraction with Cervical Funneling seen on Ultrasound in OB office  ~Not seen today       Family History Son - Bilateral Club Feet     ~Note - VSS- Afebrile - no complaints    The patient showed and reported no recent or current contractions today during reassuring ultrasound exam . Elected to forego NST today with us     The patient had a detailed ultrasound performed today which was reassuring . A detailed report is included in the EMR under the imaging tab from today's date. 1.Vertex male @ 29w1d with biometry consistent with clinical dates. 1176g (2:9) 50% ile   2. Anatomic survey performed as noted above. Anatomy was limited by fetal position. 3. Amniotic fluid appeared normal amount. 4. Placenta is anterior without evidence of previa. 5. Transabdominal Ultrasound: The cervix measures 29.1 mm without evidence of funneling.    ~ Abdomen soft, no contractions noted .  No changes with Valsalva   PRECAUTIONS REVIEWED      Hipolito Elias MD  Maternal Fetal Medicine    Followup  PRN

## 2022-10-08 ENCOUNTER — HOSPITAL ENCOUNTER (OUTPATIENT)
Age: 37
Discharge: HOME | End: 2022-10-08
Attending: LEGAL MEDICINE | Admitting: LEGAL MEDICINE
Payer: MEDICAID

## 2022-10-08 ENCOUNTER — APPOINTMENT (OUTPATIENT)
Dept: ULTRASOUND IMAGING | Age: 37
End: 2022-10-08
Payer: MEDICAID

## 2022-10-08 VITALS
DIASTOLIC BLOOD PRESSURE: 71 MMHG | SYSTOLIC BLOOD PRESSURE: 128 MMHG | RESPIRATION RATE: 16 BRPM | HEART RATE: 60 BPM | TEMPERATURE: 98.1 F

## 2022-10-08 PROBLEM — O26.93 COMPLICATION OF PREGNANCY IN THIRD TRIMESTER: Status: ACTIVE | Noted: 2022-10-08

## 2022-10-08 LAB
AMPHETAMINE SCREEN, URINE: NOT DETECTED
BARBITURATE SCREEN URINE: NOT DETECTED
BENZODIAZEPINE SCREEN, URINE: NOT DETECTED
CANNABINOID SCREEN URINE: POSITIVE
COCAINE METABOLITE SCREEN URINE: NOT DETECTED
FENTANYL SCREEN, URINE: NOT DETECTED
Lab: ABNORMAL
METHADONE SCREEN, URINE: NOT DETECTED
OPIATE SCREEN URINE: NOT DETECTED
OXYCODONE URINE: NOT DETECTED
PHENCYCLIDINE SCREEN URINE: NOT DETECTED

## 2022-10-08 PROCEDURE — 76819 FETAL BIOPHYS PROFIL W/O NST: CPT

## 2022-10-08 PROCEDURE — 80307 DRUG TEST PRSMV CHEM ANLYZR: CPT

## 2022-10-08 PROCEDURE — G0480 DRUG TEST DEF 1-7 CLASSES: HCPCS

## 2022-10-08 PROCEDURE — 99202 OFFICE O/P NEW SF 15 MIN: CPT

## 2022-10-08 NOTE — DISCHARGE INSTRUCTIONS
Home Undelivered Discharge Instructions    After Discharge Orders:    Keep all future appointments. Follow up in office Monday at 4pm    Call physician with any questions              Diet:  normal diet as tolerated    Rest: normal activity as tolerated    Other instructions: Do kick counts once a day on your baby. Choose the time of day your baby is most active. Get in a comfortable lying or sitting position and time how long it takes to feel 10 kicks, twists, turns, swishes, or rolls.  Call your physician or midwife if there have not been 10 kicks in 2 hours    Call physician or midwife, return to Labor and Delivery, call 911, or go to the nearest Emergency Room if: increased leakage or fluid, decreased fetal movement, persistent low back pain or cramping, bleeding from vaginal area, difficulty urinating, pain with urination, difficulty breathing, persistent headache, or vision change

## 2022-10-08 NOTE — PROGRESS NOTES
Dr. Андрей Willingham notified of patient's arrival, complaint, FHR tracing, and uterine activity. Orders received.

## 2022-10-08 NOTE — H&P
This is discharge summary, not H+P. ADMITTING DIAGNOSIS: IUP at 29 1/2 weeks with decreased fetal movements        DISCHARGE DIAGNOSIS: [address] COURSE IN DETAIL: Fetal heart rate tracing was reactive. She stated she felt good fetal movement. She had no other complaints. Priority Sent On From To Message Type    10/8/2022  7:21 AM Jaycob, Mhy Incoming Results From Cameron Agarwal MD CC'd Results     Radiation Dose Estimates    No radiation information found for this patient  Narrative   EXAMINATION:   BIOPHYSICAL PROFILE WITHOUT NON-STRESS TEST       10/8/2022       TECHNIQUE:   ULTRASOUND BIOPHYSICAL PROFILE WITHOUT NON-STRESS TEST       COMPARISON:   None       HISTORY:   ORDERING SYSTEM PROVIDED HISTORY: decreased fetal movement   TECHNOLOGIST PROVIDED HISTORY:   Reason for exam:->decreased fetal movement   What reading provider will be dictating this exam?->CRC       FINDINGS:   BIOPHYSICAL PROFILE:       Fetal Tone:  2/2       Gross Body:  2/2       Breathin/2       Qualitative Fluid:  2/2       Biophysical Profile Score:  8/8       OTHER FINDINGS:       The JOHN is 15.8 cm. Active fetal heart and motion are noted. The fetal   heart rate is 153 beats per minute. The fetus is in a cephalic lie. The   placenta is anterior in location. There is no optimal visualization of the   maternal cervix. Impression   1. Biophysical profile score 8/8, which is normal.               Amphetamine Screen, Urine               NOT D... Amphetamine Screen, Urine     Benzodiazepine Screen, Urine               NOT D... Benzodiazepine Screen, Urine     Cocaine Metabolite Screen, Urine               NOT D... Cocaine Metabolite Screen, Urine     FENTANYL SCREEN, URINE               NOT D... FENTANYL SCREEN, URINE     METHADONE SCREEN, URINE, 22390               NOT D... METHADONE SCREEN, URINE, 85108     Opiate Scrn, Ur               NOT D...    Opiate Scrn, Ur     Oxycodone Urine NOT D... Oxycodone Urine     PCP Screen, Urine               NOT D... PCP Screen, Urine     Cannabinoid Scrn, Ur               POSIT. .. Cannabinoid Scrn, Ur     Drug Screen Comment:               see b... A: NO EVIDENCE OF FETAL OR MATERNAL COMPROMISE  P: HOME WITH FETAL MOVEMENT AND  LABOR PRECAUTIONS. RTO . SHE INDICATES UNDERSTANDING OF ALL INSTRUCTIONS.

## 2022-10-08 NOTE — H&P
Patient is a     44  year old female who presents on   10/8/22             with complaints of decreased fetal movement for 1 day. For her past medical, surgical, family history, please refer to ACOG forms A and B. Review of Systems : Negative for cardiac,  respiratory,  GI,  ,  neurologic,  psychiatric,  ENT,  integument,  hematologic,  lymphatic,  constitutional abnormalies    Physical Exam:  VSS  afebrile  HEENT: Negative  CV: RRR  ABDOMEN:Gravid, soft non-tender. EXTREMITIES: No clubbing, cyanosis, edema  NEURO: CN II-XI grossly intact  SHE IS ALERT AND ORIENTED TIMES 4    FHT's 140 with positive beat to beat variability             No contractions             Accelerations noted. A: Intrauterine pregnancy at    34 !/2       weeks with complaints of decreased fetal movement. Advanced maternal age. H/o UDS positive   for THC. P:  BPP       UDS       Further management based on clinical findings.

## 2022-10-08 NOTE — DISCHARGE SUMMARY
ADMITTING DIAGNOSIS: IUP at 29 1/2 weeks with decreased fetal movements        DISCHARGE DIAGNOSIS: [address] COURSE IN DETAIL: Fetal heart rate tracing was reactive. She stated she felt good fetal movements. She had no other complaints. Priority Sent On From To Message Type    10/8/2022  7:21 AM Jaycob, Mhy Incoming Results From Vivien Litten, MD CC'd Results     Radiation Dose Estimates    No radiation information found for this patient  Narrative   EXAMINATION:   BIOPHYSICAL PROFILE WITHOUT NON-STRESS TEST       10/8/2022       TECHNIQUE:   ULTRASOUND BIOPHYSICAL PROFILE WITHOUT NON-STRESS TEST       COMPARISON:   None       HISTORY:   ORDERING SYSTEM PROVIDED HISTORY: decreased fetal movement   TECHNOLOGIST PROVIDED HISTORY:   Reason for exam:->decreased fetal movement   What reading provider will be dictating this exam?->CRC       FINDINGS:   BIOPHYSICAL PROFILE:       Fetal Tone:  2/2       Gross Body:  2/2       Breathin/2       Qualitative Fluid:  2/2       Biophysical Profile Score:  8/8       OTHER FINDINGS:       The JOHN is 15.8 cm. Active fetal heart and motion are noted. The fetal   heart rate is 153 beats per minute. The fetus is in a cephalic lie. The   placenta is anterior in location. There is no optimal visualization of the   maternal cervix. Impression   1. Biophysical profile score 8/8, which is normal.               Amphetamine Screen, Urine               NOT D... Amphetamine Screen, Urine     Benzodiazepine Screen, Urine               NOT D... Benzodiazepine Screen, Urine     Cocaine Metabolite Screen, Urine               NOT D... Cocaine Metabolite Screen, Urine     FENTANYL SCREEN, URINE               NOT D... FENTANYL SCREEN, URINE     METHADONE SCREEN, URINE, 46091               NOT D... METHADONE SCREEN, URINE, 57249     Opiate Scrn, Ur               NOT D... Opiate Scrn, Ur     Oxycodone Urine               NOT D...    Oxycodone Urine PCP Screen, Urine               NOT D... PCP Screen, Urine     Cannabinoid Scrn, Ur               POSIT. .. Cannabinoid Scrn, Ur     Drug Screen Comment:               see b... A: NO EVIDENCE OF MATERNAL OR FETAL COMPROMISE. STABLE. P: HOME WITH FETAL MOVEMENT AND  LABOR PRECAUTIONS. RTO . SHE INDICATES UNDERSTANDING OF ALL INSTRUCTIONS.

## 2022-10-08 NOTE — PROGRESS NOTES
Written and verbal discharge instructions given. Patient verbalized understanding. Pt discharged from unit in stable condition.

## 2022-10-10 LAB
COMMENT: NORMAL
INTEGRITY CHECK, CREATININE, URINE: 289.6
INTEGRITY CHECK, OXIDANT, URINE: <40
INTEGRITY CHECK, PH, URINE: 6.4 (ref 4.5–9)
INTEGRITY CHECK, SPECIFIC GRAVITY, URINE: 1.03 (ref 1–1.03)
INTEGRITY CHECK, SPECIMEN INTEGRITY, URINE: ABNORMAL
THC NORMALIZED, QUANTITIATIVE, URINE: NORMAL
THC-COOH, QUANTITATIVE, URINE: >1000

## 2022-10-22 ENCOUNTER — APPOINTMENT (OUTPATIENT)
Dept: LABOR AND DELIVERY | Age: 37
End: 2022-10-22
Payer: MEDICAID

## 2022-10-22 ENCOUNTER — APPOINTMENT (OUTPATIENT)
Dept: ULTRASOUND IMAGING | Age: 37
End: 2022-10-22
Payer: MEDICAID

## 2022-10-22 ENCOUNTER — HOSPITAL ENCOUNTER (OUTPATIENT)
Age: 37
Discharge: HOME | End: 2022-10-22
Attending: LEGAL MEDICINE | Admitting: LEGAL MEDICINE
Payer: MEDICAID

## 2022-10-22 VITALS — SYSTOLIC BLOOD PRESSURE: 120 MMHG | DIASTOLIC BLOOD PRESSURE: 70 MMHG | HEART RATE: 70 BPM

## 2022-10-22 PROBLEM — O26.93 COMPLICATED PREGNANCY, THIRD TRIMESTER: Status: ACTIVE | Noted: 2022-10-22

## 2022-10-22 PROCEDURE — 76819 FETAL BIOPHYS PROFIL W/O NST: CPT

## 2022-10-22 PROCEDURE — 59025 FETAL NON-STRESS TEST: CPT

## 2022-10-22 NOTE — PROGRESS NOTES
Dr. Katy Sahni notified that EFM tracing is now reactive, but was originally nonreactive with a couple of mild variable decelerations present. Orders received.

## 2022-10-22 NOTE — PROGRESS NOTES
31w6d  [facility] 22 ambulatory to L&D for scheduled NST due to [facility] and GDM. EFM applied. Maternal perception of fetal movement noted. Denies bleeding, leaking of fluid, contractions, or any other complaints. Call light in reach.

## 2022-10-23 NOTE — DISCHARGE SUMMARY
ADMITTING DIAGNOSIS: Intrauterine pregnancy at    31 1/2          weeks with variable decelerations        DISCHARGE DIAGNOSIS: SAME, as well as no evidence of maternal or fetal compromise. HOSPITAL COURSE IN DETAIL:    Fetal heart rate tracing remained reactive, with no further decelerations. BPP:      FINDINGS:   BIOPHYSICAL PROFILE:       Fetal Tone:  2/2       Gross Body:  2/2       Breathin/2       Qualitative Fluid:  2/2       Biophysical Profile Score:  8/8       OTHER FINDINGS:       Fetal heart rate averaged 135 beats per minute. Currently cephalic presentation. Anterior placenta with no evidence of previa. Visually normal amniotic fluid which measured 12.5 cm. Systolic to diastolic ratios on this exam were 2.2, 2.4, and 2.2. Clinical age provided is 26 weeks and 0 days       Estimated date of delivery based on clinical age provided: 2022           Impression   1. Normal biophysical profile, 8 of 8. Priority Sent On From To Message Type    10/8/2022  7:21 AM Jaycob, Mhy Incoming Results From José Luis Nascimento MD CC'd Results     Radiation Dose Estimates    No radiation information found for this patient  Narrative   EXAMINATION:   BIOPHYSICAL PROFILE WITHOUT NON-STRESS TEST       10/8/2022       TECHNIQUE:   ULTRASOUND BIOPHYSICAL PROFILE WITHOUT NON-STRESS TEST       COMPARISON:   None       HISTORY:   ORDERING SYSTEM PROVIDED HISTORY: decreased fetal movement   TECHNOLOGIST PROVIDED HISTORY:   Reason for exam:->decreased fetal movement   What reading provider will be dictating this exam?->CRC       FINDINGS:   BIOPHYSICAL PROFILE:       Fetal Tone:  2/2       Gross Body:  2/2       Breathin/2       Qualitative Fluid:  2/2       Biophysical Profile Score:  8/8       OTHER FINDINGS:       The JOHN is 15.8 cm. Active fetal heart and motion are noted. The fetal   heart rate is 153 beats per minute. The fetus is in a cephalic lie.   The placenta is anterior in location. There is no optimal visualization of the   maternal cervix. Impression   1. Biophysical profile score 8/8, which is normal.               Amphetamine Screen, Urine               NOT D... Amphetamine Screen, Urine     Benzodiazepine Screen, Urine               NOT D... Benzodiazepine Screen, Urine     Cocaine Metabolite Screen, Urine               NOT D... Cocaine Metabolite Screen, Urine     FENTANYL SCREEN, URINE               NOT D... FENTANYL SCREEN, URINE     METHADONE SCREEN, URINE, 56546               NOT D... METHADONE SCREEN, URINE, [id number]     Opiate Scrn, Ur               NOT D... Opiate Scrn, Ur     Oxycodone Urine               NOT D... Oxycodone Urine     PCP Screen, Urine               NOT D... PCP Screen, Urine     Cannabinoid Scrn, Ur               POSIT. .. Cannabinoid Scrn, Ur     Drug Screen Comment:               see b. .. She was stable. She was sent home with precautions regarding:        Fetal movement         labor        She is to keep her follow up appointment at the office. She indicated understanding of all instructions.

## 2022-10-23 NOTE — DISCHARGE INSTRUCTIONS
Home Undelivered Discharge Instructions    After Discharge Orders:    Keep all future appointments. Call physician or [address] office with any questions. Diet:  normal diet as tolerated    Rest: normal activity as tolerated    Other instructions: Do kick counts once a day on your baby. Choose the time of day your baby is most active. Get in a comfortable lying or sitting position and time how long it takes to feel 10 kicks, twists, turns, swishes, or rolls.  Call your physician or midwife if there have not been 10 kicks in 2 hours    Call physician or midwife, return to Labor and Delivery, call 911, or go to the nearest Emergency Room if: increased leakage or fluid, decreased fetal movement, persistent low back pain or cramping, bleeding from vaginal area, difficulty urinating, pain with urination, difficulty breathing, new calf pain, persistent headache, or vision change

## 2022-10-23 NOTE — PROGRESS NOTES
Dr Moncada Seats updated on BPP results and okay for discharge. [id number] [facility] faxed to Dr Chloe Mcdowell office.

## 2022-10-23 NOTE — H&P
Patient is a  44     year old female who presents on   10/22   for NST due to AMA, gestational DM, UDS +. At time of NST, note was made of 3 mild variables. For her past medical, surgical, gyn, family history please refer to ACOG forms A and B. Review of Systems : Negative for cardiac,  respiratory,  GI,  ,  neurologic,  psychiatric,  ENT,  integument,  hematologic,  lymphatic,  constitutional abnormalies    Physical Exam:  VSS  afebrile        FHT's 140 with positive beat to beat variability. 3 mild variables. No contractions        A: Intrauterine pregnancy at   31 1/2        weeks with variable decelerations    P:  BPP, continue to monitor fetal heart rate tracing.

## 2022-12-01 ENCOUNTER — APPOINTMENT (OUTPATIENT)
Dept: ULTRASOUND IMAGING | Age: 37
DRG: 560 | End: 2022-12-01
Payer: MEDICAID

## 2022-12-01 ENCOUNTER — HOSPITAL ENCOUNTER (INPATIENT)
Age: 37
LOS: 2 days | Discharge: HOME | DRG: 560 | End: 2022-12-05
Attending: LEGAL MEDICINE | Admitting: LEGAL MEDICINE
Payer: MEDICAID

## 2022-12-01 PROBLEM — Z34.93 NORMAL PREGNANCY IN THIRD TRIMESTER: Status: ACTIVE | Noted: 2022-12-01

## 2022-12-01 PROBLEM — O26.93 COMPLICATION OF PREGNANCY, THIRD TRIMESTER: Status: ACTIVE | Noted: 2022-12-01

## 2022-12-01 LAB
ABO/RH: NORMAL
ALBUMIN SERPL-MCNC: 2.9 G/DL (ref 3.5–5.2)
ALBUMIN SERPL-MCNC: 3.5 G/DL (ref 3.5–5.2)
ALP BLD-CCNC: 115 U/L (ref 35–104)
ALP BLD-CCNC: 98 U/L (ref 35–104)
ALT SERPL-CCNC: 52 U/L (ref 0–32)
ALT SERPL-CCNC: 53 U/L (ref 0–32)
AMPHETAMINE SCREEN, URINE: NOT DETECTED
ANION GAP SERPL CALCULATED.3IONS-SCNC: 13 MMOL/L (ref 7–16)
ANION GAP SERPL CALCULATED.3IONS-SCNC: 13 MMOL/L (ref 7–16)
ANTIBODY SCREEN: NORMAL
AST SERPL-CCNC: 39 U/L (ref 0–31)
AST SERPL-CCNC: 41 U/L (ref 0–31)
BACTERIA: ABNORMAL /HPF
BARBITURATE SCREEN URINE: NOT DETECTED
BASOPHILS ABSOLUTE: 0.02 E9/L (ref 0–0.2)
BASOPHILS ABSOLUTE: 0.03 E9/L (ref 0–0.2)
BASOPHILS RELATIVE PERCENT: 0.3 % (ref 0–2)
BASOPHILS RELATIVE PERCENT: 0.5 % (ref 0–2)
BENZODIAZEPINE SCREEN, URINE: NOT DETECTED
BILIRUB SERPL-MCNC: 0.5 MG/DL (ref 0–1.2)
BILIRUB SERPL-MCNC: 0.6 MG/DL (ref 0–1.2)
BILIRUBIN URINE: ABNORMAL
BLOOD, URINE: NEGATIVE
BUN BLDV-MCNC: 6 MG/DL (ref 6–20)
BUN BLDV-MCNC: 7 MG/DL (ref 6–20)
CALCIUM SERPL-MCNC: 8.2 MG/DL (ref 8.6–10.2)
CALCIUM SERPL-MCNC: 8.6 MG/DL (ref 8.6–10.2)
CANNABINOID SCREEN URINE: POSITIVE
CHLORIDE BLD-SCNC: 100 MMOL/L (ref 98–107)
CHLORIDE BLD-SCNC: 102 MMOL/L (ref 98–107)
CLARITY: CLEAR
CO2: 19 MMOL/L (ref 22–29)
CO2: 21 MMOL/L (ref 22–29)
COCAINE METABOLITE SCREEN URINE: NOT DETECTED
COLOR: ABNORMAL
CREAT SERPL-MCNC: 0.6 MG/DL (ref 0.5–1)
CREAT SERPL-MCNC: 0.6 MG/DL (ref 0.5–1)
EOSINOPHILS ABSOLUTE: 0.01 E9/L (ref 0.05–0.5)
EOSINOPHILS ABSOLUTE: 0.02 E9/L (ref 0.05–0.5)
EOSINOPHILS RELATIVE PERCENT: 0.2 % (ref 0–6)
EOSINOPHILS RELATIVE PERCENT: 0.3 % (ref 0–6)
EPITHELIAL CELLS, UA: ABNORMAL /HPF
FENTANYL SCREEN, URINE: NOT DETECTED
GFR SERPL CREATININE-BSD FRML MDRD: >60 ML/MIN/1.73
GFR SERPL CREATININE-BSD FRML MDRD: >60 ML/MIN/1.73
GLUCOSE BLD-MCNC: 64 MG/DL (ref 74–99)
GLUCOSE BLD-MCNC: 65 MG/DL (ref 74–99)
GLUCOSE URINE: NEGATIVE MG/DL
HCT VFR BLD CALC: 28 % (ref 34–48)
HCT VFR BLD CALC: 30 % (ref 34–48)
HEMOGLOBIN: 9.5 G/DL (ref 11.5–15.5)
HEMOGLOBIN: 9.9 G/DL (ref 11.5–15.5)
IMMATURE GRANULOCYTES #: 0.05 E9/L
IMMATURE GRANULOCYTES #: 0.07 E9/L
IMMATURE GRANULOCYTES %: 0.8 % (ref 0–5)
IMMATURE GRANULOCYTES %: 1.1 % (ref 0–5)
INFLUENZA A BY PCR: NOT DETECTED
INFLUENZA B BY PCR: NOT DETECTED
KETONES, URINE: >=80 MG/DL
LACTATE DEHYDROGENASE: 173 U/L (ref 135–214)
LACTATE DEHYDROGENASE: 184 U/L (ref 135–214)
LEUKOCYTE ESTERASE, URINE: NEGATIVE
LYMPHOCYTES ABSOLUTE: 1.41 E9/L (ref 1.5–4)
LYMPHOCYTES ABSOLUTE: 1.71 E9/L (ref 1.5–4)
LYMPHOCYTES RELATIVE PERCENT: 23 % (ref 20–42)
LYMPHOCYTES RELATIVE PERCENT: 25.8 % (ref 20–42)
Lab: ABNORMAL
MCH RBC QN AUTO: 30.9 PG (ref 26–35)
MCH RBC QN AUTO: 31.5 PG (ref 26–35)
MCHC RBC AUTO-ENTMCNC: 33 % (ref 32–34.5)
MCHC RBC AUTO-ENTMCNC: 33.9 % (ref 32–34.5)
MCV RBC AUTO: 92.7 FL (ref 80–99.9)
MCV RBC AUTO: 93.8 FL (ref 80–99.9)
METER GLUCOSE: 68 MG/DL (ref 74–99)
METHADONE SCREEN, URINE: NOT DETECTED
MONOCYTES ABSOLUTE: 0.3 E9/L (ref 0.1–0.95)
MONOCYTES ABSOLUTE: 0.45 E9/L (ref 0.1–0.95)
MONOCYTES RELATIVE PERCENT: 4.9 % (ref 2–12)
MONOCYTES RELATIVE PERCENT: 6.8 % (ref 2–12)
NEUTROPHILS ABSOLUTE: 4.34 E9/L (ref 1.8–7.3)
NEUTROPHILS ABSOLUTE: 4.35 E9/L (ref 1.8–7.3)
NEUTROPHILS RELATIVE PERCENT: 65.7 % (ref 43–80)
NEUTROPHILS RELATIVE PERCENT: 70.6 % (ref 43–80)
NITRITE, URINE: NEGATIVE
OPIATE SCREEN URINE: NOT DETECTED
OXYCODONE URINE: NOT DETECTED
PDW BLD-RTO: 12.9 FL (ref 11.5–15)
PDW BLD-RTO: 13.1 FL (ref 11.5–15)
PH UA: 6.5 (ref 5–9)
PHENCYCLIDINE SCREEN URINE: NOT DETECTED
PLATELET # BLD: 172 E9/L (ref 130–450)
PLATELET # BLD: 180 E9/L (ref 130–450)
PMV BLD AUTO: 10.9 FL (ref 7–12)
PMV BLD AUTO: 11 FL (ref 7–12)
POTASSIUM SERPL-SCNC: 3.7 MMOL/L (ref 3.5–5)
POTASSIUM SERPL-SCNC: 3.8 MMOL/L (ref 3.5–5)
PROTEIN UA: ABNORMAL MG/DL
RBC # BLD: 3.02 E12/L (ref 3.5–5.5)
RBC # BLD: 3.2 E12/L (ref 3.5–5.5)
RBC UA: ABNORMAL /HPF (ref 0–2)
SARS-COV-2, NAAT: NOT DETECTED
SODIUM BLD-SCNC: 132 MMOL/L (ref 132–146)
SODIUM BLD-SCNC: 136 MMOL/L (ref 132–146)
SPECIFIC GRAVITY UA: 1.02 (ref 1–1.03)
TOTAL PROTEIN: 5.5 G/DL (ref 6.4–8.3)
TOTAL PROTEIN: 6.4 G/DL (ref 6.4–8.3)
TROPONIN, HIGH SENSITIVITY: <6 NG/L (ref 0–9)
URIC ACID, SERUM: 4.7 MG/DL (ref 2.4–5.7)
URIC ACID, SERUM: 4.7 MG/DL (ref 2.4–5.7)
UROBILINOGEN, URINE: 4 E.U./DL
WBC # BLD: 6.1 E9/L (ref 4.5–11.5)
WBC # BLD: 6.6 E9/L (ref 4.5–11.5)
WBC UA: ABNORMAL /HPF (ref 0–5)

## 2022-12-01 PROCEDURE — 96375 TX/PRO/DX INJ NEW DRUG ADDON: CPT

## 2022-12-01 PROCEDURE — 96361 HYDRATE IV INFUSION ADD-ON: CPT

## 2022-12-01 PROCEDURE — 87635 SARS-COV-2 COVID-19 AMP PRB: CPT

## 2022-12-01 PROCEDURE — 2580000003 HC RX 258: Performed by: LEGAL MEDICINE

## 2022-12-01 PROCEDURE — 6360000002 HC RX W HCPCS: Performed by: LEGAL MEDICINE

## 2022-12-01 PROCEDURE — A4216 STERILE WATER/SALINE, 10 ML: HCPCS | Performed by: LEGAL MEDICINE

## 2022-12-01 PROCEDURE — 36415 COLL VENOUS BLD VENIPUNCTURE: CPT

## 2022-12-01 PROCEDURE — 86900 BLOOD TYPING SEROLOGIC ABO: CPT

## 2022-12-01 PROCEDURE — 81001 URINALYSIS AUTO W/SCOPE: CPT

## 2022-12-01 PROCEDURE — 84550 ASSAY OF BLOOD/URIC ACID: CPT

## 2022-12-01 PROCEDURE — 96376 TX/PRO/DX INJ SAME DRUG ADON: CPT

## 2022-12-01 PROCEDURE — G0480 DRUG TEST DEF 1-7 CLASSES: HCPCS

## 2022-12-01 PROCEDURE — 93005 ELECTROCARDIOGRAM TRACING: CPT | Performed by: LEGAL MEDICINE

## 2022-12-01 PROCEDURE — 84484 ASSAY OF TROPONIN QUANT: CPT

## 2022-12-01 PROCEDURE — 2580000003 HC RX 258

## 2022-12-01 PROCEDURE — 76819 FETAL BIOPHYS PROFIL W/O NST: CPT

## 2022-12-01 PROCEDURE — 86901 BLOOD TYPING SEROLOGIC RH(D): CPT

## 2022-12-01 PROCEDURE — 87502 INFLUENZA DNA AMP PROBE: CPT

## 2022-12-01 PROCEDURE — 82962 GLUCOSE BLOOD TEST: CPT

## 2022-12-01 PROCEDURE — 85025 COMPLETE CBC W/AUTO DIFF WBC: CPT

## 2022-12-01 PROCEDURE — 80053 COMPREHEN METABOLIC PANEL: CPT

## 2022-12-01 PROCEDURE — 96374 THER/PROPH/DIAG INJ IV PUSH: CPT

## 2022-12-01 PROCEDURE — 86850 RBC ANTIBODY SCREEN: CPT

## 2022-12-01 PROCEDURE — 80307 DRUG TEST PRSMV CHEM ANLYZR: CPT

## 2022-12-01 PROCEDURE — 96360 HYDRATION IV INFUSION INIT: CPT

## 2022-12-01 PROCEDURE — 93970 EXTREMITY STUDY: CPT

## 2022-12-01 PROCEDURE — 2500000003 HC RX 250 WO HCPCS: Performed by: LEGAL MEDICINE

## 2022-12-01 PROCEDURE — G0378 HOSPITAL OBSERVATION PER HR: HCPCS

## 2022-12-01 PROCEDURE — 99253 IP/OBS CNSLTJ NEW/EST LOW 45: CPT | Performed by: STUDENT IN AN ORGANIZED HEALTH CARE EDUCATION/TRAINING PROGRAM

## 2022-12-01 PROCEDURE — 76705 ECHO EXAM OF ABDOMEN: CPT

## 2022-12-01 PROCEDURE — 83615 LACTATE (LD) (LDH) ENZYME: CPT

## 2022-12-01 RX ORDER — ONDANSETRON HYDROCHLORIDE 8 MG/1
4 TABLET, FILM COATED ORAL EVERY 8 HOURS PRN
Status: DISCONTINUED | OUTPATIENT
Start: 2022-12-01 | End: 2022-12-03

## 2022-12-01 RX ORDER — SODIUM CHLORIDE 0.9 % (FLUSH) 0.9 %
SYRINGE (ML) INJECTION
Status: COMPLETED
Start: 2022-12-01 | End: 2022-12-01

## 2022-12-01 RX ORDER — METOCLOPRAMIDE HYDROCHLORIDE 5 MG/ML
10 INJECTION INTRAMUSCULAR; INTRAVENOUS EVERY 6 HOURS
Status: DISCONTINUED | OUTPATIENT
Start: 2022-12-01 | End: 2022-12-02

## 2022-12-01 RX ORDER — SODIUM CHLORIDE, SODIUM LACTATE, POTASSIUM CHLORIDE, CALCIUM CHLORIDE 600; 310; 30; 20 MG/100ML; MG/100ML; MG/100ML; MG/100ML
INJECTION, SOLUTION INTRAVENOUS CONTINUOUS
Status: DISCONTINUED | OUTPATIENT
Start: 2022-12-01 | End: 2022-12-03 | Stop reason: SDUPTHER

## 2022-12-01 RX ADMIN — METOCLOPRAMIDE 10 MG: 5 INJECTION, SOLUTION INTRAMUSCULAR; INTRAVENOUS at 14:19

## 2022-12-01 RX ADMIN — FAMOTIDINE 20 MG: 10 INJECTION, SOLUTION INTRAVENOUS at 20:47

## 2022-12-01 RX ADMIN — METOCLOPRAMIDE 10 MG: 5 INJECTION, SOLUTION INTRAMUSCULAR; INTRAVENOUS at 20:43

## 2022-12-01 RX ADMIN — FAMOTIDINE 20 MG: 10 INJECTION, SOLUTION INTRAVENOUS at 14:18

## 2022-12-01 RX ADMIN — Medication: at 20:50

## 2022-12-01 NOTE — PROGRESS NOTES
Perfect serve sent to Dr. Ignacio Willis for consult and reading of EKG. Phone call placed with Dr. Christin Stephen for new consult.

## 2022-12-01 NOTE — PROGRESS NOTES
,  36 . Pt arrived with c/o high blood pressure, nausea, blurry vision, and cx. Pt states she perceives fetal movement and denies any bleeding or leaking.

## 2022-12-01 NOTE — CONSULTS
[facility]      Reason for Consult:  Medical management    History of Present Illness:  44 y.o. female with a history of GERD, DINORA and current pregnancy at 37 weeks presents with chest pressure. The patient states she has been getting it on and off the past week but got worse the past two days which is why she came in. She states that it feels like an elephant is sitting on her chest, in the middle. She states the pain does not radiate. She states it is associated with SOB, N/V and excessive sweating. She denies any fever/chills or coughing. She states she her dad has had multiple strokes and other people in her family have had strokes as well but she does not recall any history of heart disease. Informant(s) for H&P: Patient, EMR review    REVIEW OF SYSTEMS:  Complete ROS performed with patient, pertinent positives and negatives are listed in the HPI. PMH:  Past Medical History:   Diagnosis Date    Postpartum depression        Surgical History:  Past Surgical History:   Procedure Laterality Date    DILATION AND CURETTAGE      TONSILLECTOMY AND ADENOIDECTOMY         Medications Prior to Admission:    Prior to Admission medications    Medication Sig Start Date End Date Taking?  Authorizing Provider   FEROSUL 325 (65 Fe) MG tablet take 1 tablet by mouth twice a day 9/15/22   Historical Provider, MD   metoclopramide (REGLAN) 10 MG tablet take 1 tablet by mouth four times a day if needed 9/15/22   Historical Provider, MD FRANCO VITAMIN C 250 MG tablet take 1 tablet by mouth twice a day 9/15/22   Historical Provider, MD   progesterone (PROMETRIUM) 200 MG CAPS capsule INSERT 1 CAPSULE PER VAGINA EVERY EVENING BEFORE BED 9/15/22   Historical Provider, MD   lansoprazole (PREVACID) 15 MG delayed release capsule take 1 capsule by mouth once daily 9/15/22   Historical Provider, MD   doxylamine-pyridoxine 10-10 MG TBEC Take 2 tablets by mouth nightly 5/2/22   Historical Provider, MD       Allergies:    Patient has no known allergies. Social History:    reports that she quit smoking about 4 years ago. Her smoking use included cigarettes. She has never used smokeless tobacco. She reports that she does not drink alcohol and does not use drugs. Family History:   family history includes Breast Cancer in her paternal grandmother; Hypertension in her father. Strokes in her father. PHYSICAL EXAM:  Vitals:  BP (!) 145/88   Pulse 61   Temp 98.5 °F (36.9 °C) (Oral)       General Appearance: alert and oriented to person, place and time and in no acute distress  Skin: warm and dry  Head: normocephalic and atraumatic  Eyes: pupils equal, round, and reactive to light, extraocular eye movements intact, conjunctivae normal  Neck: neck supple and non tender without mass   Pulmonary/Chest: clear to auscultation bilaterally- no wheezes, rales or rhonchi, normal air movement, no respiratory distress  Cardiovascular: normal rate, normal S1 and S2 and no carotid bruits  Abdomen: soft, non-tender, non-distended, normal bowel sounds, no masses or organomegaly  Extremities: no cyanosis, no clubbing and no edema  Neurologic: no cranial nerve deficit and speech normal        LABS:  Recent Labs     12/01/22  1040      K 3.7      CO2 21*   BUN 6   CREATININE 0.6   GLUCOSE 65*   CALCIUM 8.6       Recent Labs     12/01/22  1040   WBC 6.6   RBC 3.20*   HGB 9.9*   HCT 30.0*   MCV 93.8   MCH 30.9   MCHC 33.0   RDW 12.9      MPV 10.9       No results for input(s): POCGLU in the last 72 hours. Radiology:   US DUP LOWER EXTREMITIES BILATERAL VENOUS    (Results Pending)   US LIVER    (Results Pending)   US FETAL BIOPHYSICAL PROFILE WO NON STRESS TESTING    (Results Pending)       EKG:   NSR    ASSESSMENT:      Principal Problem:    Normal pregnancy in third trimester  Resolved Problems:    * No resolved hospital problems.  *      PLAN:    Chest pain  -EKG, CBC, CMP, LDH, b/l LE duplex ordered by primary team  -EKG no acute ST changes  -Will check troponin, rapid COVID and flu as well  -Vitals stable, monitor on telemetry    Elevated LFTs  -Alk phos 115, was 50 in August  -ALT and AST mildly elevated as well  -Liver US ordered by primary team    Pregnancy  -Patient 37 weeks  -Management by primary team, obgyn       Thank you very much for this interesting consult and we will continue to follow along. Electronically signed by Mlaik Hernández MD on 12/1/2022 at 2:56 PM    NOTE: This report was transcribed using voice recognition software. Every effort was made to ensure accuracy; however, inadvertent computerized transcription errors may be present.

## 2022-12-01 NOTE — PROGRESS NOTES
Phone call placed to Dr. Alison Ceja. Notified of pt's arrival and complaints. Awaiting call back for orders.

## 2022-12-01 NOTE — PROGRESS NOTES
Patient continues to state has mid sternal chest pain. Pain is a level 7, and was a level 10 on admission. Has had Reglan and Pepcid since admission, and vomited twice. No headache, visual changes, right upper quadrant or midepigastric pain. No lower extremity pain or swelling or redness. Declines pain Rx. Possibility of PE reviewed with her, and option of CT chest reviewed, and reviewed risk of radiation to infant. At this time, states pain not that bad, and declines CT of chest.  Will continue to monitor closely. Will repeat labs and follow up.

## 2022-12-02 LAB
24HR URINE VOLUME (ML): 700 ML
CREATININE 24 HOUR URINE: 1078 MG/24H (ref 720–1510)
EKG ATRIAL RATE: 63 BPM
EKG P AXIS: -28 DEGREES
EKG P-R INTERVAL: 136 MS
EKG Q-T INTERVAL: 418 MS
EKG QRS DURATION: 78 MS
EKG QTC CALCULATION (BAZETT): 427 MS
EKG R AXIS: 44 DEGREES
EKG T AXIS: 31 DEGREES
EKG VENTRICULAR RATE: 63 BPM
INTEGRITY CHECK, CREATININE, URINE: 249.9
INTEGRITY CHECK, OXIDANT, URINE: <40
INTEGRITY CHECK, PH, URINE: 7 (ref 4.5–9)
INTEGRITY CHECK, SPECIFIC GRAVITY, URINE: 1.02 (ref 1–1.03)
INTEGRITY CHECK, SPECIMEN INTEGRITY, URINE: NORMAL
Lab: 24 HOURS
METER GLUCOSE: 61 MG/DL (ref 74–99)
METER GLUCOSE: 70 MG/DL (ref 74–99)
METER GLUCOSE: 97 MG/DL (ref 74–99)
PROTEIN 24 HOUR URINE: 0.24 G/24HR (ref 0–0.14)

## 2022-12-02 PROCEDURE — 96372 THER/PROPH/DIAG INJ SC/IM: CPT

## 2022-12-02 PROCEDURE — 2580000003 HC RX 258: Performed by: LEGAL MEDICINE

## 2022-12-02 PROCEDURE — 82962 GLUCOSE BLOOD TEST: CPT

## 2022-12-02 PROCEDURE — G0378 HOSPITAL OBSERVATION PER HR: HCPCS

## 2022-12-02 PROCEDURE — A4216 STERILE WATER/SALINE, 10 ML: HCPCS | Performed by: LEGAL MEDICINE

## 2022-12-02 PROCEDURE — 96361 HYDRATE IV INFUSION ADD-ON: CPT

## 2022-12-02 PROCEDURE — 6360000002 HC RX W HCPCS: Performed by: LEGAL MEDICINE

## 2022-12-02 PROCEDURE — 96376 TX/PRO/DX INJ SAME DRUG ADON: CPT

## 2022-12-02 PROCEDURE — 2500000003 HC RX 250 WO HCPCS: Performed by: LEGAL MEDICINE

## 2022-12-02 PROCEDURE — 96375 TX/PRO/DX INJ NEW DRUG ADDON: CPT

## 2022-12-02 PROCEDURE — 93010 ELECTROCARDIOGRAM REPORT: CPT | Performed by: INTERNAL MEDICINE

## 2022-12-02 PROCEDURE — 96360 HYDRATION IV INFUSION INIT: CPT

## 2022-12-02 PROCEDURE — 96374 THER/PROPH/DIAG INJ IV PUSH: CPT

## 2022-12-02 PROCEDURE — 99232 SBSQ HOSP IP/OBS MODERATE 35: CPT | Performed by: STUDENT IN AN ORGANIZED HEALTH CARE EDUCATION/TRAINING PROGRAM

## 2022-12-02 PROCEDURE — 84156 ASSAY OF PROTEIN URINE: CPT

## 2022-12-02 RX ORDER — METOCLOPRAMIDE HYDROCHLORIDE 5 MG/ML
10 INJECTION INTRAMUSCULAR; INTRAVENOUS EVERY 6 HOURS PRN
Status: DISCONTINUED | OUTPATIENT
Start: 2022-12-02 | End: 2022-12-03

## 2022-12-02 RX ADMIN — METOCLOPRAMIDE 10 MG: 5 INJECTION, SOLUTION INTRAMUSCULAR; INTRAVENOUS at 02:50

## 2022-12-02 RX ADMIN — FAMOTIDINE 20 MG: 10 INJECTION, SOLUTION INTRAVENOUS at 09:09

## 2022-12-02 RX ADMIN — METOCLOPRAMIDE 10 MG: 5 INJECTION, SOLUTION INTRAMUSCULAR; INTRAVENOUS at 22:56

## 2022-12-02 RX ADMIN — FAMOTIDINE 20 MG: 10 INJECTION, SOLUTION INTRAVENOUS at 23:02

## 2022-12-02 RX ADMIN — METOCLOPRAMIDE 10 MG: 5 INJECTION, SOLUTION INTRAMUSCULAR; INTRAVENOUS at 09:13

## 2022-12-02 RX ADMIN — SODIUM CHLORIDE, POTASSIUM CHLORIDE, SODIUM LACTATE AND CALCIUM CHLORIDE: 600; 310; 30; 20 INJECTION, SOLUTION INTRAVENOUS at 05:21

## 2022-12-02 RX ADMIN — METOCLOPRAMIDE 10 MG: 5 INJECTION, SOLUTION INTRAMUSCULAR; INTRAVENOUS at 17:29

## 2022-12-02 NOTE — PROGRESS NOTES
Dr. Kesha Brown called and updated on patient lab results, BP and FHT. Order received to allow patient to go on intermittent monitoring, one hour per shift, unless patient starts feeling contractions.

## 2022-12-02 NOTE — PLAN OF CARE
Problem: Respiratory - Adult  Goal: Achieves optimal ventilation and oxygenation  Outcome: Progressing     Problem: Gastrointestinal - Adult  Goal: Minimal or absence of nausea and vomiting  Outcome: Progressing  Goal: Maintains adequate nutritional intake  Outcome: Progressing     Problem: Infection - Adult  Goal: Absence of infection during hospitalization  Outcome: Progressing  Goal: Absence of fever/infection during anticipated neutropenic period  Outcome: Progressing

## 2022-12-02 NOTE — PROGRESS NOTES
[address] [place], [place] [address]   Progress Note    Admitting Date and Time: 12/1/2022  8:45 AM  Admit Dx: Normal pregnancy in third trimester [Z63.80]  Complication of pregnancy, third trimester [O26.93]    Subjective:    Pt feels better today, she states that the chest pressure resolved    Per RN: as above    ROS: denies fever, chills, cp, sob, n/v, HA unless stated above.      famotidine (PEPCID) injection  20 mg IntraVENous BID     metoclopramide, 10 mg, Q6H PRN  ondansetron, 4 mg, Q8H PRN       Objective:    BP (!) 145/76   Pulse 53   Temp 98.6 °F (37 °C) (Oral)   Resp 16   Ht 5' 2\" (1.575 m)   Wt 152 lb (68.9 kg)   SpO2 96%   BMI 27.80 kg/m²   General Appearance: alert and oriented to person, place and time and in no acute distress  Skin: warm and dry  Head: normocephalic and atraumatic  Eyes: pupils equal, round, and reactive to light, extraocular eye movements intact, conjunctivae normal  Neck: neck supple and non tender without mass   Pulmonary/Chest: clear to auscultation bilaterally- no wheezes, rales or rhonchi, normal air movement, no respiratory distress  Cardiovascular: normal rate, normal S1 and S2 and no carotid bruits  Abdomen: soft, non-tender, non-distended, normal bowel sounds, no masses or organomegaly  Extremities: no cyanosis, no clubbing and no edema  Neurologic: no cranial nerve deficit and speech normal      Recent Labs     12/01/22  1040 12/01/22  1835    132   K 3.7 3.8    100   CO2 21* 19*   BUN 6 7   CREATININE 0.6 0.6   GLUCOSE 65* 64*   CALCIUM 8.6 8.2*       Recent Labs     12/01/22  1040 12/01/22  1835   ALKPHOS 115* 98   PROT 6.4 5.5*   LABALBU 3.5 2.9*   BILITOT 0.6 0.5   AST 39* 41*   ALT 52* 53*       Recent Labs     12/01/22  1040 12/01/22  1835   WBC 6.6 6.1   RBC 3.20* 3.02*   HGB 9.9* 9.5*   HCT 30.0* 28.0*   MCV 93.8 92.7   MCH 30.9 31.5   MCHC 33.0 33.9   RDW 12.9 13.1    172   MPV 10.9 11.0         Radiology:   US DUP LOWER EXTREMITIES BILATERAL VENOUS   Final Result   No evidence of DVT in either lower extremity. US LIVER   Final Result   1. Normal appearance of the imaged liver. No hepatic mass or evidence of   intrahepatic ductal dilatation. The extrahepatic common bile duct appears   within normal limits measuring 3 mm. 2.  Gallbladder sludge. There are few sub 5 mm gallbladder polyps. 3.  Normal appearance of the right kidney. Mild right hydronephrosis. RECOMMENDATIONS:   Recommend routine follow-up gallbladder ultrasound in 6 months to reassess   the polyps. US FETAL BIOPHYSICAL PROFILE WO NON STRESS TESTING   Final Result   1. Normal biophysical profile, 8 of 8.      2.  Fetal heart rate was 152 beats per minute. Cephalic presentation with no   evidence of previa. Normal amniotic fluid which measured 12.8 cm. Assessment:  Principal Problem:    Normal pregnancy in third trimester  Active Problems:    Complication of pregnancy, third trimester  Resolved Problems:    * No resolved hospital problems. *      Plan:  Chest pain  -EKG, CBC, CMP, LDH, b/l LE duplex ordered by primary team  -EKG no acute ST changes, troponin negative, chest pain resolved  -Rapid COVID and flu negative as well  -Vitals stable, monitor on telemetry  -Ok to discharge from medicine POV when ok with primary     Elevated LFTs  -Alk phos 115, was 50 in August, normalized today  -ALT and AST mildly elevated as well, about same today  -Liver US showed normal liver, gallbladder polyps that should be followed up in 6 months to re-assess per radiology     Pregnancy  -Patient 37 weeks gestation  -Management by primary team, obgyn     NOTE: This report was transcribed using voice recognition software. Every effort was made to ensure accuracy; however, inadvertent computerized transcription errors may be present.      Electronically signed by Conrad Vazquez MD on 12/2/2022 at 6:35 PM

## 2022-12-02 NOTE — PROGRESS NOTES
RN to bedside for initial patient contact. White board updated with  staff changes, Fresh water offered  and was declined. US adjusted. Call light within reach, no concerns at this time. Rest encouraged.

## 2022-12-02 NOTE — PROGRESS NOTES
RN to bedside for night assessment. Vital signs obtained WNL. Ice water offer and provided. Call light within reach, no concerns at this time.

## 2022-12-02 NOTE — H&P
[facility] [address]                              HISTORY AND PHYSICAL    PATIENT NAME: Jose Mclean             :        1985  MED REC NO:   [id number]                            ROOM:       [facility]  ACCOUNT NO:   [de-identified]                           ADMIT DATE: 2022. .. PROVIDER:     Sandra Crawley MD    HISTORY OF PRESENT ILLNESS:  The patient is a 50-year-old black female  presented on 2022 with complaints of chest pain. She has had  chest pain since the evening. She had actually been seen earlier in the  office on 2022 and had no complaints at that time. However, note  was made of blood pressure of 120/95. Repeat blood pressures were  within normal limits. She denied any PIH symptoms. CBC, CMP, uric acid  and LDH were drawn. All the tests were normal except the hemoglobin was  10.5 and her ALT was 35. She then presented to the hospital on  2022 with the previously mentioned complaints. The chest pain is  nonradiating. She has rated the pain as #10 when she arrived. Currently, the pain is a #7. It is not positional.  It is not relieved  with anything. It is not radiating. She denies a cough. No sore  throat. No fever or chills. No dyspnea on exertion. No shortness of  breath. Has had some nausea and vomiting. Has vomited twice. She is  passing flatus. Had a normal bowel movement yesterday. No change in  her urinary habits. No midepigastric or right upper quadrant pain. Had  some headaches initially which resolved. Had some light scotomata which  subsequently resolved. Had been given PIH precautions when she had been  seen yesterday, and was checking her blood pressures at home and stated  that her pressures at home had been elevated in the 150s over 90s. No  leaking fluid or vaginal bleeding. No change in fetal movements.   Has  had some irregular contractions. For her past medical, surgical, GYN, social, family history, please  refer to ACOG forms A and B. REVIEW OF SYSTEMS:  Negative for , psychiatric, or constitutional  abnormalities. PHYSICAL EXAMINATION:  VITAL SIGNS:  On physical exam, her blood pressure was 157/84, pulse 49,  temperature 98.5, O2 sat 99% on room air. Pressures since then have  ranged between 145-166 over 78-88. Pulses have been anywhere from  51-48. O2 sats remained in the 96 percentile. HEENT:  Negative. LUNGS:  Clear to auscultation. CV:  Regular rate and rhythm. ABDOMEN:  Gravid, soft, nontender. Normal bowel sounds are present. Fetal heart tones are present in 130s with fzzd-wn-xlcu variability  present. Acceleration is noted. Irregular contractions. EXTREMITIES:  No clubbing, cyanosis. 1+ edema. No cords or erythema. LABORATORY DATA:  Her creatinine is 0.6, ALT 52, AST 39, glucose 68. Troponin less than 6, . Uric acid 4.7. Urine drug screen  positive for marijuana. White count 6.6, hemoglobin 9.9, platelets  148,261. Rapid flu negative. COVID testing negative. UA with greater  than 80 of ketones, few bacteria, few epithelial cells, trace of  protein. EKG was unremarkable with normal sinus rhythm. Ultrasound of  the lower extremities was negative for DVT. Biophysical profile is 8/8  with amniotic fluid index of 12.8. ASSESSMENT:  Intrauterine pregnancy at 37-1/2 weeks with complaints of  chest pain of unclear etiology. Possible acid reflux. Elevated blood  pressure, which may be due to gestational hypertension versus  preeclampsia. PLAN:  Internal medicine consult was obtained. 24 hour urine for  protein is in progress. Ultrasound of the liver and gallbladder has  been ordered and is still pending,  after having been done at 13:33. It  is now 18:07. We will continue monitoring her vital signs. Repeat CBC  and CMP, uric acid and LDH.   Further management based on clinical  findings.         Shar Perdomo MD    D: 12/01/2022 18:07:47       T: 12/01/2022 18:11:17     [email or web address]  Job#: [id number]     Doc#: [id number]    CC:

## 2022-12-02 NOTE — PROGRESS NOTES
Patient denies chest pain . denies discomfort states mild nausea refuses medication for nausea.  24 hour urine continues

## 2022-12-02 NOTE — PLAN OF CARE
Problem: Respiratory - Adult  Goal: Achieves optimal ventilation and oxygenation  12/2/2022 0824 by Travis Soria RN  Outcome: Progressing  12/2/2022 0108 by Emile Mason RN  Outcome: Progressing     Problem: Gastrointestinal - Adult  Goal: Minimal or absence of nausea and vomiting  12/2/2022 0824 by Travis Soria RN  Outcome: Progressing  12/2/2022 0108 by Emile Mason RN  Outcome: Progressing  Goal: Maintains adequate nutritional intake  12/2/2022 0824 by Travis Soria RN  Outcome: Progressing  12/2/2022 0108 by Emile Mason RN  Outcome: Progressing     Problem: Infection - Adult  Goal: Absence of infection during hospitalization  12/2/2022 0824 by Travis Soria RN  Outcome: Progressing  12/2/2022 0108 by Emile Mason RN  Outcome: Progressing  Goal: Absence of fever/infection during anticipated neutropenic period  12/2/2022 0824 by Travis Soria RN  Outcome: Progressing  12/2/2022 0108 by Emile Mason RN  Outcome: Progressing     Problem: Pain  Goal: Verbalizes/displays adequate comfort level or baseline comfort level  Outcome: Progressing     Problem: Safety - Adult  Goal: Free from fall injury  Outcome: Progressing

## 2022-12-03 ENCOUNTER — ANESTHESIA EVENT (OUTPATIENT)
Dept: LABOR AND DELIVERY | Age: 37
End: 2022-12-03
Payer: MEDICAID

## 2022-12-03 ENCOUNTER — ANESTHESIA (OUTPATIENT)
Dept: LABOR AND DELIVERY | Age: 37
End: 2022-12-03
Payer: MEDICAID

## 2022-12-03 PROBLEM — Z34.90 ENCOUNTER FOR INDUCTION OF LABOR: Status: ACTIVE | Noted: 2022-12-03

## 2022-12-03 LAB
ALBUMIN SERPL-MCNC: 3.4 G/DL (ref 3.5–5.2)
ALBUMIN SERPL-MCNC: 3.9 G/DL (ref 3.5–5.2)
ALP BLD-CCNC: 115 U/L (ref 35–104)
ALP BLD-CCNC: 146 U/L (ref 35–104)
ALT SERPL-CCNC: 63 U/L (ref 0–32)
ALT SERPL-CCNC: 77 U/L (ref 0–32)
ANION GAP SERPL CALCULATED.3IONS-SCNC: 11 MMOL/L (ref 7–16)
ANION GAP SERPL CALCULATED.3IONS-SCNC: 14 MMOL/L (ref 7–16)
AST SERPL-CCNC: 38 U/L (ref 0–31)
AST SERPL-CCNC: 46 U/L (ref 0–31)
BILIRUB SERPL-MCNC: 0.7 MG/DL (ref 0–1.2)
BILIRUB SERPL-MCNC: 0.8 MG/DL (ref 0–1.2)
BUN BLDV-MCNC: 6 MG/DL (ref 6–20)
BUN BLDV-MCNC: 7 MG/DL (ref 6–20)
CALCIUM SERPL-MCNC: 7.5 MG/DL (ref 8.6–10.2)
CALCIUM SERPL-MCNC: 8.4 MG/DL (ref 8.6–10.2)
CHLORIDE BLD-SCNC: 101 MMOL/L (ref 98–107)
CHLORIDE BLD-SCNC: 95 MMOL/L (ref 98–107)
CO2: 21 MMOL/L (ref 22–29)
CO2: 22 MMOL/L (ref 22–29)
COMMENT: NORMAL
CREAT SERPL-MCNC: 0.6 MG/DL (ref 0.5–1)
CREAT SERPL-MCNC: 0.7 MG/DL (ref 0.5–1)
GFR SERPL CREATININE-BSD FRML MDRD: >60 ML/MIN/1.73
GFR SERPL CREATININE-BSD FRML MDRD: >60 ML/MIN/1.73
GLUCOSE BLD-MCNC: 107 MG/DL (ref 74–99)
GLUCOSE BLD-MCNC: 75 MG/DL (ref 74–99)
HCT VFR BLD CALC: 31.7 % (ref 34–48)
HEMOGLOBIN: 10.4 G/DL (ref 11.5–15.5)
MAGNESIUM: 4.3 MG/DL (ref 1.6–2.6)
MAGNESIUM: 4.7 MG/DL (ref 1.6–2.6)
MCH RBC QN AUTO: 30.5 PG (ref 26–35)
MCHC RBC AUTO-ENTMCNC: 32.8 % (ref 32–34.5)
MCV RBC AUTO: 93 FL (ref 80–99.9)
METER GLUCOSE: 68 MG/DL (ref 74–99)
METER GLUCOSE: 95 MG/DL (ref 74–99)
METER GLUCOSE: 97 MG/DL (ref 74–99)
PDW BLD-RTO: 13 FL (ref 11.5–15)
PLATELET # BLD: 196 E9/L (ref 130–450)
PMV BLD AUTO: 10.5 FL (ref 7–12)
POTASSIUM SERPL-SCNC: 3.5 MMOL/L (ref 3.5–5)
POTASSIUM SERPL-SCNC: 3.7 MMOL/L (ref 3.5–5)
RBC # BLD: 3.41 E12/L (ref 3.5–5.5)
SODIUM BLD-SCNC: 131 MMOL/L (ref 132–146)
SODIUM BLD-SCNC: 133 MMOL/L (ref 132–146)
THC NORMALIZED, QUANTITIATIVE, URINE: NORMAL
THC-COOH, QUANTITATIVE, URINE: >1000
TOTAL PROTEIN: 6 G/DL (ref 6.4–8.3)
TOTAL PROTEIN: 7.4 G/DL (ref 6.4–8.3)
WBC # BLD: 5.4 E9/L (ref 4.5–11.5)

## 2022-12-03 PROCEDURE — 2500000003 HC RX 250 WO HCPCS: Performed by: ANESTHESIOLOGY

## 2022-12-03 PROCEDURE — A4216 STERILE WATER/SALINE, 10 ML: HCPCS | Performed by: LEGAL MEDICINE

## 2022-12-03 PROCEDURE — 3E033VJ INTRODUCTION OF OTHER HORMONE INTO PERIPHERAL VEIN, PERCUTANEOUS APPROACH: ICD-10-PCS | Performed by: LEGAL MEDICINE

## 2022-12-03 PROCEDURE — 2500000003 HC RX 250 WO HCPCS: Performed by: LEGAL MEDICINE

## 2022-12-03 PROCEDURE — 88307 TISSUE EXAM BY PATHOLOGIST: CPT

## 2022-12-03 PROCEDURE — 6370000000 HC RX 637 (ALT 250 FOR IP): Performed by: LEGAL MEDICINE

## 2022-12-03 PROCEDURE — 1220000001 HC SEMI PRIVATE L&D R&B

## 2022-12-03 PROCEDURE — 36415 COLL VENOUS BLD VENIPUNCTURE: CPT

## 2022-12-03 PROCEDURE — 51702 INSERT TEMP BLADDER CATH: CPT

## 2022-12-03 PROCEDURE — 59050 FETAL MONITOR W/REPORT: CPT

## 2022-12-03 PROCEDURE — 3700000025 EPIDURAL BLOCK: Performed by: ANESTHESIOLOGY

## 2022-12-03 PROCEDURE — 7200000001 HC VAGINAL DELIVERY

## 2022-12-03 PROCEDURE — 80053 COMPREHEN METABOLIC PANEL: CPT

## 2022-12-03 PROCEDURE — 85027 COMPLETE CBC AUTOMATED: CPT

## 2022-12-03 PROCEDURE — 10907ZC DRAINAGE OF AMNIOTIC FLUID, THERAPEUTIC FROM PRODUCTS OF CONCEPTION, VIA NATURAL OR ARTIFICIAL OPENING: ICD-10-PCS | Performed by: LEGAL MEDICINE

## 2022-12-03 PROCEDURE — 82962 GLUCOSE BLOOD TEST: CPT

## 2022-12-03 PROCEDURE — 99232 SBSQ HOSP IP/OBS MODERATE 35: CPT | Performed by: STUDENT IN AN ORGANIZED HEALTH CARE EDUCATION/TRAINING PROGRAM

## 2022-12-03 PROCEDURE — 6360000002 HC RX W HCPCS: Performed by: LEGAL MEDICINE

## 2022-12-03 PROCEDURE — 83735 ASSAY OF MAGNESIUM: CPT

## 2022-12-03 PROCEDURE — 2580000003 HC RX 258: Performed by: LEGAL MEDICINE

## 2022-12-03 RX ORDER — SODIUM CHLORIDE 9 MG/ML
INJECTION, SOLUTION INTRAVENOUS PRN
Status: DISCONTINUED | OUTPATIENT
Start: 2022-12-03 | End: 2022-12-05 | Stop reason: HOSPADM

## 2022-12-03 RX ORDER — HYDRALAZINE HYDROCHLORIDE 10 MG/1
10 TABLET, FILM COATED ORAL EVERY 6 HOURS PRN
Status: DISCONTINUED | OUTPATIENT
Start: 2022-12-03 | End: 2022-12-05 | Stop reason: HOSPADM

## 2022-12-03 RX ORDER — LIDOCAINE HYDROCHLORIDE 10 MG/ML
30 INJECTION, SOLUTION EPIDURAL; INFILTRATION; INTRACAUDAL; PERINEURAL PRN
Status: DISCONTINUED | OUTPATIENT
Start: 2022-12-03 | End: 2022-12-03

## 2022-12-03 RX ORDER — SODIUM CHLORIDE 0.9 % (FLUSH) 0.9 %
5-40 SYRINGE (ML) INJECTION PRN
Status: DISCONTINUED | OUTPATIENT
Start: 2022-12-03 | End: 2022-12-03

## 2022-12-03 RX ORDER — NALOXONE HYDROCHLORIDE 0.4 MG/ML
INJECTION, SOLUTION INTRAMUSCULAR; INTRAVENOUS; SUBCUTANEOUS PRN
Status: DISCONTINUED | OUTPATIENT
Start: 2022-12-03 | End: 2022-12-03

## 2022-12-03 RX ORDER — SODIUM CHLORIDE 0.9 % (FLUSH) 0.9 %
5-40 SYRINGE (ML) INJECTION PRN
Status: DISCONTINUED | OUTPATIENT
Start: 2022-12-03 | End: 2022-12-05 | Stop reason: HOSPADM

## 2022-12-03 RX ORDER — SODIUM CHLORIDE, SODIUM LACTATE, POTASSIUM CHLORIDE, CALCIUM CHLORIDE 600; 310; 30; 20 MG/100ML; MG/100ML; MG/100ML; MG/100ML
INJECTION, SOLUTION INTRAVENOUS CONTINUOUS
Status: DISCONTINUED | OUTPATIENT
Start: 2022-12-03 | End: 2022-12-05 | Stop reason: HOSPADM

## 2022-12-03 RX ORDER — SODIUM CHLORIDE, SODIUM LACTATE, POTASSIUM CHLORIDE, AND CALCIUM CHLORIDE .6; .31; .03; .02 G/100ML; G/100ML; G/100ML; G/100ML
1000 INJECTION, SOLUTION INTRAVENOUS PRN
Status: DISCONTINUED | OUTPATIENT
Start: 2022-12-03 | End: 2022-12-03

## 2022-12-03 RX ORDER — SODIUM CHLORIDE 9 MG/ML
25 INJECTION, SOLUTION INTRAVENOUS PRN
Status: DISCONTINUED | OUTPATIENT
Start: 2022-12-03 | End: 2022-12-03

## 2022-12-03 RX ORDER — DOCUSATE SODIUM 100 MG/1
100 CAPSULE, LIQUID FILLED ORAL 2 TIMES DAILY
Status: DISCONTINUED | OUTPATIENT
Start: 2022-12-03 | End: 2022-12-05 | Stop reason: HOSPADM

## 2022-12-03 RX ORDER — SODIUM CHLORIDE 0.9 % (FLUSH) 0.9 %
5-40 SYRINGE (ML) INJECTION EVERY 12 HOURS SCHEDULED
Status: DISCONTINUED | OUTPATIENT
Start: 2022-12-03 | End: 2022-12-05 | Stop reason: HOSPADM

## 2022-12-03 RX ORDER — CALCIUM GLUCONATE 94 MG/ML
1000 INJECTION, SOLUTION INTRAVENOUS PRN
Status: DISCONTINUED | OUTPATIENT
Start: 2022-12-03 | End: 2022-12-03

## 2022-12-03 RX ORDER — SODIUM CHLORIDE 0.9 % (FLUSH) 0.9 %
5-40 SYRINGE (ML) INJECTION EVERY 12 HOURS SCHEDULED
Status: DISCONTINUED | OUTPATIENT
Start: 2022-12-03 | End: 2022-12-03

## 2022-12-03 RX ORDER — OXYCODONE HYDROCHLORIDE 5 MG/1
10 TABLET ORAL EVERY 4 HOURS PRN
Status: DISCONTINUED | OUTPATIENT
Start: 2022-12-03 | End: 2022-12-05 | Stop reason: HOSPADM

## 2022-12-03 RX ORDER — MODIFIED LANOLIN
OINTMENT (GRAM) TOPICAL PRN
Status: DISCONTINUED | OUTPATIENT
Start: 2022-12-03 | End: 2022-12-05 | Stop reason: HOSPADM

## 2022-12-03 RX ORDER — ONDANSETRON 2 MG/ML
4 INJECTION INTRAMUSCULAR; INTRAVENOUS EVERY 6 HOURS PRN
Status: DISCONTINUED | OUTPATIENT
Start: 2022-12-03 | End: 2022-12-03

## 2022-12-03 RX ORDER — SODIUM CHLORIDE, SODIUM LACTATE, POTASSIUM CHLORIDE, CALCIUM CHLORIDE 600; 310; 30; 20 MG/100ML; MG/100ML; MG/100ML; MG/100ML
INJECTION, SOLUTION INTRAVENOUS CONTINUOUS
Status: DISCONTINUED | OUTPATIENT
Start: 2022-12-03 | End: 2022-12-03

## 2022-12-03 RX ORDER — HYDRALAZINE HYDROCHLORIDE 20 MG/ML
5 INJECTION INTRAMUSCULAR; INTRAVENOUS ONCE
Status: COMPLETED | OUTPATIENT
Start: 2022-12-03 | End: 2022-12-03

## 2022-12-03 RX ORDER — LIDOCAINE HYDROCHLORIDE 10 MG/ML
INJECTION, SOLUTION INFILTRATION; PERINEURAL
Status: DISCONTINUED
Start: 2022-12-03 | End: 2022-12-03

## 2022-12-03 RX ORDER — MISOPROSTOL 200 UG/1
800 TABLET ORAL PRN
Status: DISCONTINUED | OUTPATIENT
Start: 2022-12-03 | End: 2022-12-03

## 2022-12-03 RX ORDER — OXYCODONE HYDROCHLORIDE 5 MG/1
5 TABLET ORAL EVERY 6 HOURS PRN
Status: DISCONTINUED | OUTPATIENT
Start: 2022-12-03 | End: 2022-12-05 | Stop reason: HOSPADM

## 2022-12-03 RX ORDER — NALBUPHINE HCL 10 MG/ML
5 AMPUL (ML) INJECTION EVERY 4 HOURS PRN
Status: DISCONTINUED | OUTPATIENT
Start: 2022-12-03 | End: 2022-12-03

## 2022-12-03 RX ORDER — MAGNESIUM SULFATE IN WATER 40 MG/ML
2000 INJECTION, SOLUTION INTRAVENOUS ONCE
Status: COMPLETED | OUTPATIENT
Start: 2022-12-03 | End: 2022-12-04

## 2022-12-03 RX ORDER — SODIUM CHLORIDE, SODIUM LACTATE, POTASSIUM CHLORIDE, AND CALCIUM CHLORIDE .6; .31; .03; .02 G/100ML; G/100ML; G/100ML; G/100ML
500 INJECTION, SOLUTION INTRAVENOUS PRN
Status: DISCONTINUED | OUTPATIENT
Start: 2022-12-03 | End: 2022-12-03

## 2022-12-03 RX ORDER — CARBOPROST TROMETHAMINE 250 UG/ML
250 INJECTION, SOLUTION INTRAMUSCULAR PRN
Status: DISCONTINUED | OUTPATIENT
Start: 2022-12-03 | End: 2022-12-03

## 2022-12-03 RX ORDER — MAGNESIUM SULFATE IN WATER 40 MG/ML
4000 INJECTION, SOLUTION INTRAVENOUS ONCE
Status: COMPLETED | OUTPATIENT
Start: 2022-12-03 | End: 2022-12-03

## 2022-12-03 RX ORDER — METHYLERGONOVINE MALEATE 0.2 MG/ML
200 INJECTION INTRAVENOUS PRN
Status: DISCONTINUED | OUTPATIENT
Start: 2022-12-03 | End: 2022-12-03

## 2022-12-03 RX ORDER — FERROUS SULFATE 325(65) MG
325 TABLET ORAL
Status: DISCONTINUED | OUTPATIENT
Start: 2022-12-03 | End: 2022-12-05 | Stop reason: HOSPADM

## 2022-12-03 RX ADMIN — SODIUM CHLORIDE, POTASSIUM CHLORIDE, SODIUM LACTATE AND CALCIUM CHLORIDE: 600; 310; 30; 20 INJECTION, SOLUTION INTRAVENOUS at 06:15

## 2022-12-03 RX ADMIN — ONDANSETRON HYDROCHLORIDE 4 MG: 8 TABLET, FILM COATED ORAL at 04:02

## 2022-12-03 RX ADMIN — HYDRALAZINE HYDROCHLORIDE 10 MG: 10 TABLET, FILM COATED ORAL at 21:29

## 2022-12-03 RX ADMIN — SODIUM CHLORIDE, POTASSIUM CHLORIDE, SODIUM LACTATE AND CALCIUM CHLORIDE: 600; 310; 30; 20 INJECTION, SOLUTION INTRAVENOUS at 13:25

## 2022-12-03 RX ADMIN — Medication 15 ML/HR: at 10:36

## 2022-12-03 RX ADMIN — MAGNESIUM SULFATE HEPTAHYDRATE 2000 MG: 40 INJECTION, SOLUTION INTRAVENOUS at 09:04

## 2022-12-03 RX ADMIN — MAGNESIUM SULFATE HEPTAHYDRATE 2000 MG/HR: 40 INJECTION, SOLUTION INTRAVENOUS at 09:19

## 2022-12-03 RX ADMIN — Medication 15 ML: at 10:35

## 2022-12-03 RX ADMIN — OXYCODONE 5 MG: 5 TABLET ORAL at 20:32

## 2022-12-03 RX ADMIN — DOCUSATE SODIUM 100 MG: 100 CAPSULE, LIQUID FILLED ORAL at 20:32

## 2022-12-03 RX ADMIN — FAMOTIDINE 20 MG: 10 INJECTION, SOLUTION INTRAVENOUS at 07:58

## 2022-12-03 RX ADMIN — OXYCODONE 10 MG: 5 TABLET ORAL at 14:56

## 2022-12-03 RX ADMIN — HYDRALAZINE HYDROCHLORIDE 5 MG: 20 INJECTION INTRAMUSCULAR; INTRAVENOUS at 13:22

## 2022-12-03 RX ADMIN — Medication 87.3 MILLI-UNITS/MIN: at 17:46

## 2022-12-03 RX ADMIN — SODIUM CHLORIDE, POTASSIUM CHLORIDE, SODIUM LACTATE AND CALCIUM CHLORIDE: 600; 310; 30; 20 INJECTION, SOLUTION INTRAVENOUS at 14:38

## 2022-12-03 RX ADMIN — Medication 1 MILLI-UNITS/MIN: at 06:15

## 2022-12-03 RX ADMIN — Medication 2 MILLI-UNITS/MIN: at 10:55

## 2022-12-03 RX ADMIN — MAGNESIUM SULFATE HEPTAHYDRATE 4000 MG: 40 INJECTION, SOLUTION INTRAVENOUS at 08:53

## 2022-12-03 ASSESSMENT — PAIN DESCRIPTION - LOCATION
LOCATION: BACK
LOCATION: ABDOMEN;PERINEUM

## 2022-12-03 ASSESSMENT — LIFESTYLE VARIABLES: SMOKING_STATUS: 0

## 2022-12-03 ASSESSMENT — PAIN SCALES - GENERAL
PAINLEVEL_OUTOF10: 7
PAINLEVEL_OUTOF10: 10

## 2022-12-03 ASSESSMENT — PAIN DESCRIPTION - DESCRIPTORS: DESCRIPTORS: BURNING;TENDER

## 2022-12-03 NOTE — PROGRESS NOTES
Obey Johnson at bedside for epidural, pt sat up 1022  Epidural in place 1033  Test dose 1034  Pt laid down 1036

## 2022-12-03 NOTE — PROGRESS NOTES
Mag bolus started as per order. RN remains at bedside for duration of bolus infusing.  Pt denies pih symptoms at this time

## 2022-12-03 NOTE — PROGRESS NOTES
[address] [place], [place] [address]   Progress Note    Admitting Date and Time: 12/1/2022  8:45 AM  Admit Dx: Normal pregnancy in third trimester [B73.15]  Complication of pregnancy, third trimester [O26.93]  Encounter for induction of labor [Z34.90]    Subjective:    Pt still stating that chest pain has resolved since yesterday. She delivered her baby earlier today and reports she is feeling good.     Per RN: as above    ROS: denies fever, chills, cp, sob, n/v, HA unless stated above.     sodium chloride flush  5-40 mL IntraVENous 2 times per day    docusate sodium  100 mg Oral BID    ferrous sulfate  325 mg Oral TID WC    measles, mumps & rubella vaccine  0.5 mL SubCUTAneous Prior to discharge    Tdap-Dtap  0.5 mL IntraMUSCular Prior to discharge     hydrALAZINE, 10 mg, Q6H PRN  sodium chloride flush, 5-40 mL, PRN  sodium chloride, , PRN  oxyCODONE, 5 mg, Q6H PRN   Or  oxyCODONE, 10 mg, Q4H PRN  lanolin, , PRN  witch hazel-glycerin, , PRN  benzocaine-menthol, , PRN       Objective:    BP (!) 156/86   Pulse 82   Temp 98 °F (36.7 °C) (Oral)   Resp 18   Ht 5' 2\" (1.575 m)   Wt 152 lb (68.9 kg)   SpO2 100%   Breastfeeding Unknown   BMI 27.80 kg/m²     General Appearance: alert and oriented to person, place and time and in no acute distress  Skin: warm and dry  Head: normocephalic and atraumatic  Eyes: pupils equal, round, and reactive to light, extraocular eye movements intact, conjunctivae normal  Neck: neck supple and non tender without mass   Pulmonary/Chest: clear to auscultation bilaterally- no wheezes, rales or rhonchi, normal air movement, no respiratory distress  Cardiovascular: normal rate, normal S1 and S2 and no carotid bruits  Abdomen: soft, non-tender, non-distended, normal bowel sounds, no masses or organomegaly  Extremities: no cyanosis, no clubbing and no edema  Neurologic: no cranial nerve deficit and speech normal      Recent Labs     12/01/22  1835 12/03/22  0410 12/03/22  1500    133 131*   K 3.8 3.7 3.5    101 95*   CO2 19* 21* 22   BUN 7 7 6   CREATININE 0.6 0.7 0.6   GLUCOSE 64* 75 107*   CALCIUM 8.2* 8.4* 7.5*       Recent Labs     12/01/22  1835 12/03/22  0410 12/03/22  1500   ALKPHOS 98 115* 146*   PROT 5.5* 6.0* 7.4   LABALBU 2.9* 3.4* 3.9   BILITOT 0.5 0.7 0.8   AST 41* 38* 46*   ALT 53* 63* 77*       Recent Labs     12/01/22  1040 12/01/22  1835 12/03/22  0410   WBC 6.6 6.1 5.4   RBC 3.20* 3.02* 3.41*   HGB 9.9* 9.5* 10.4*   HCT 30.0* 28.0* 31.7*   MCV 93.8 92.7 93.0   MCH 30.9 31.5 30.5   MCHC 33.0 33.9 32.8   RDW 12.9 13.1 13.0    172 196   MPV 10.9 11.0 10.5         Radiology:   US DUP LOWER EXTREMITIES BILATERAL VENOUS   Final Result   No evidence of DVT in either lower extremity. US LIVER   Final Result   1. Normal appearance of the imaged liver. No hepatic mass or evidence of   intrahepatic ductal dilatation. The extrahepatic common bile duct appears   within normal limits measuring 3 mm. 2.  Gallbladder sludge. There are few sub 5 mm gallbladder polyps. 3.  Normal appearance of the right kidney. Mild right hydronephrosis. RECOMMENDATIONS:   Recommend routine follow-up gallbladder ultrasound in 6 months to reassess   the polyps. US FETAL BIOPHYSICAL PROFILE WO NON STRESS TESTING   Final Result   1. Normal biophysical profile, 8 of 8.      2.  Fetal heart rate was 152 beats per minute. Cephalic presentation with no   evidence of previa. Normal amniotic fluid which measured 12.8 cm. Assessment:  Principal Problem:    Normal pregnancy in third trimester  Active Problems:    Complication of pregnancy, third trimester    Encounter for induction of labor  Resolved Problems:    * No resolved hospital problems.  *      Plan:  Chest pain  -EKG, CBC, CMP, LDH, b/l LE duplex ordered by primary team  -EKG no acute ST changes, troponin negative, chest pain resolved  -Rapid COVID and flu negative as well  -Vitals stable, monitor on telemetry  -Ok to discharge from medicine POV when ok with primary     Elevated LFTs  -Alk phos 115, was 50 in August, still mildly elevated today  -ALT and AST mildly elevated as well, about same today  -Liver US showed normal liver, gallbladder polyps that should be followed up in 6 months to re-assess per radiology     Pregnancy  -Patient 37 weeks gestation, induced earlier today and receiving magnesium sulfate as patient with mildly elevated liver enzymes, elevated BP and did have proteinuria  -Management by primary team, obgyn     NOTE: This report was transcribed using voice recognition software. Every effort was made to ensure accuracy; however, inadvertent computerized transcription errors may be present.      Electronically signed by Molly Jerez MD on 12/3/2022 at 6:34 PM

## 2022-12-03 NOTE — ANESTHESIA PROCEDURE NOTES
Epidural Block    Patient location during procedure: OB  Start time: 12/3/2022 10:15 AM  End time: 12/3/2022 10:31 AM  Reason for block: labor epidural  Staffing  Resident/CRNA: SHAW Warner - CRNA  Epidural  Patient position: sitting  Prep: ChloraPrep  Patient monitoring: cardiac monitor, continuous pulse ox and frequent blood pressure checks  Approach: midline  Location: L3-4  Injection technique: DEDRA air  Provider prep: mask and sterile gloves  Needle  Needle type: Tuohy   Needle gauge: 18 G  Needle length: 3.5 in  Needle insertion depth: 7 cm  Catheter type: end hole  Catheter size: 20 G.   Catheter at skin depth: 12 cm  Test dose: negativeCatheter Secured: tegaderm and tape  Assessment  Sensory level: T10  Hemodynamics: stable  Attempts: 1  Outcomes: uncomplicated and patient tolerated procedure well  Preanesthetic Checklist  Completed: patient identified, IV checked, site marked, risks and benefits discussed, surgical/procedural consents, equipment checked, pre-op evaluation, timeout performed, anesthesia consent given, oxygen available and monitors applied/VS acknowledged

## 2022-12-03 NOTE — PROGRESS NOTES
Spoke to lab to ensure how long Type and screen was acceptable. They stated 3 days, as long as patient has band on and has not left hospital.  They stated we are ok with the current one.

## 2022-12-03 NOTE — PROGRESS NOTES
Chest pain resolved. No PIH symptoms. Repeat labs continue to reveal mild increase in liver functions, with normal LDH, uric acid, creatinine. 24 hour urine protein with 240 mg of protein. Has good fetal movements. Reactive tracing. No complaints. Suspect gestational hypertension, and given the proteinuria and mild elevation in liver functions, will proceed on with induction tomorrow. Risks of vaginal delivery and operative delivery have been reviewed with her. Indications for operative delivery have been reviewed with her. Shoulder dystocia and birth trauma have been reviewed with her. All her questions have been answered and she wishes to proceed with attempt at vaginal delivery.

## 2022-12-03 NOTE — PROGRESS NOTES
Dr. Gordon Louis on unit, reviewed labs and updated on patient condition. Orders received to begin Pitocin induction at 0600 12/3/22.

## 2022-12-03 NOTE — PROGRESS NOTES
Assumed care of pt. Pt denies any symptoms of pih at this time. Pt is starting to feel some contractions rates them 3/10. Fetal heart rate and uterine activity being monitored every 15 minutes by RN while pt in labor.

## 2022-12-03 NOTE — PROGRESS NOTES
Dr. Kesha Brown on unit notified of blood pressures, see orders for [facility]

## 2022-12-03 NOTE — PROGRESS NOTES
Dr. Kristie Melvin in room for delivery art [id number]. Pt began pushing 1317, fetal heart rate and uterine activity being monitored every 5 minutes while pushing. RN and Dr. Kristie Melvin continuously remains at bedside.

## 2022-12-03 NOTE — PROGRESS NOTES
Dr. Luke Webster at bedside performed AROM at [phone], clear bloody show fluid, vaginal exam 1cm.  Pt may have epidural on request.

## 2022-12-03 NOTE — ANESTHESIA POSTPROCEDURE EVALUATION
Department of Anesthesiology  Postprocedure Note    Patient: Ney Dennis  MRN: [id number]  YOB: 1985  Date of evaluation: 12/3/2022      Procedure Summary     Date: 12/03/22 Room / Location:     Anesthesia Start: 1020 Anesthesia Stop: 6320    Procedure: Labor Analgesia Diagnosis:     Scheduled Providers:  Responsible Provider: Laila Pierre MD    Anesthesia Type: general, spinal, epidural ASA Status: 2          Anesthesia Type: No value filed.     Bobby Phase I:      Bobby Phase II:        Anesthesia Post Evaluation    Patient location during evaluation: bedside  Patient participation: complete - patient participated  Level of consciousness: awake and alert  Airway patency: patent  Nausea & Vomiting: no nausea and no vomiting  Complications: no  Cardiovascular status: hemodynamically stable  Respiratory status: acceptable  Hydration status: euvolemic  Comments: Patient satisfied with epidural for labor

## 2022-12-03 NOTE — PROGRESS NOTES
Fetal heart tones and uterine contractions are assessed every 15 minutes while IV pitocin is infusing.

## 2022-12-03 NOTE — DISCHARGE INSTRUCTIONS
May discharge home     IF FROM               until  discharge      Temp less than 100 F   BP less than   140/90   No nausea or  vomiting   Heart rate    Oxygen saturation 95% or higher   Ambulating well   Voiding well   Minimal bleeding   No chest pain, shortness of breath, dyspnea on exertion   Pain controlled with oral pain meds   No calf or leg pain or redness   No complaints      Nothing per vagina and no tub baths for 6 weeks. May shower. Call doctor with any problems. Home with routine  POST PARTUM   and PIH  precautions    CALL DOCTOR BEFORE PATIENT IS DISCHARGED    Return to office: 12/13 9 am.         Learning About Preeclampsia After Childbirth  What is preeclampsia? Preeclampsia means that your blood pressure during pregnancy is higher than usual. You may also have other serious symptoms. Preeclampsia can be dangerous. When it is severe, it can cause seizures (eclampsia) or liver or kidney damage. When it affects the liver, it can cause HELLP syndrome, a blood-clotting and bleeding problem. HELLP can come on quickly and can be deadly. This is why your doctor checks you and your baby often. Preeclampsia usually occurs after 20 weeks of pregnancy. Most often, it starts near the end of pregnancy and goes away after childbirth. But symptoms may last a few weeks or more and can get worse after delivery. Rarely, symptoms of preeclampsia don't show up until days or even weeks after childbirth. What are the symptoms? Mild preeclampsia usually doesn't cause symptoms. But preeclampsia can cause rapid weight gain and sudden swelling of the hands and face. Severe preeclampsia does cause symptoms. It can cause a very bad headache and trouble seeing and breathing. It also can cause belly pain. You may also urinate less than usual.  What can you expect after you have had preeclampsia? In the hospital  After the baby and the placenta are delivered, preeclampsia usually starts to improve.  Most women get better in the first few days after childbirth. After having preeclampsia, you still have a risk of seizures for a day or more after childbirth. (Very rarely, seizures happen later on.) So your doctor may have you take magnesium sulfate for a day or more to prevent seizures. You may also take medicine to lower your blood pressure. When you go home  Your blood pressure will most likely return to normal a few days after delivery. Your doctor will want to check your blood pressure sometime in the first week after you leave the hospital.  Some women still have high blood pressure 6 weeks after childbirth. But most return to normal levels over the long term. Take and record your blood pressure at home if your doctor tells you to. Ask your doctor to check your blood pressure monitor to be sure that it is accurate and that the cuff fits you. Also ask your doctor to watch you use it, to make sure that you are using it right. You should not eat, use tobacco products, or use medicine known to raise blood pressure (such as some nasal decongestant sprays) before you take your blood pressure. Avoid taking your blood pressure if you have just exercised. Also avoid taking it if you are nervous or upset. Rest at least 15 minutes before you take your blood pressure. Be safe with medicines. If you take medicine, take it exactly as prescribed. Call your doctor if you think you are having a problem with your medicine. Do not smoke. Quitting smoking will help improve your baby's growth and health. If you need help quitting, talk to your doctor about stop-smoking programs and medicines. These can increase your chances of quitting for good. Eat a balanced and healthy diet that has lots of fruits and vegetables. Long-term health  After you have had preeclampsia, you have a higher-than-average risk of heart disease, stroke, and kidney disease.  This may be because the same things that cause preeclampsia also cause heart and kidney disease. To protect your health, work with your doctor on living a heart-healthy lifestyle and getting the checkups you need. Your doctor may also want you to check your blood pressure at home. Follow-up care is a key part of your treatment and safety. Be sure to make and go to all appointments, and call your doctor if you are having problems. It's also a good idea to know your test results and keep a list of the medicines you take. When should you call for help? Share this information with your partner or a friend. They can help you watch for warning signs. Call 911  anytime you think you may need emergency care. For example, call if:    You passed out (lost consciousness). You have a seizure. Call your doctor now or seek immediate medical care if:    You have symptoms of preeclampsia, such as:  Sudden swelling of your face, hands, or feet. New vision problems (such as dimness, blurring, or seeing spots). A severe headache. Your blood pressure is very high, such as 160/110 or higher. Your blood pressure is higher than your doctor told you it should be, or it rises quickly. You have new nausea or vomiting. You have pain in your belly or pelvis. Watch closely for changes in your health, and be sure to contact your doctor if:    You gain weight rapidly. Where can you learn more? Go to https://[place] Biotechjostin.[employer]. org and sign in to your [employer] account. Enter I920 in the [facility] box to learn more about \"Learning About Preeclampsia After Childbirth. \"     If you do not have an account, please click on the \"Sign Up Now\" link. Current as of: February 23, 2022               Content Version: 13.4  © 5772-7248 Healthwise, Incorporated. Care instructions adapted under license by [facility] ([facility]).  If you have questions about a medical condition or this instruction, always ask your healthcare professional. Raul Snow disclaims any warranty or soreness. Your period may not start for several months if you are breastfeeding. You may bleed more, and longer at first, than you did before you got pregnant. Wait until you are healed (about 4 to 6 weeks) before you have sex. Ask your doctor when it is okay for you to have sex. Try not to travel with your baby for 5 or 6 weeks. If you take a long car trip, make frequent stops to walk around and stretch. Avoid exhaustion  Rest every day. Try to nap when your baby naps. Ask another adult to be with you for a few days after delivery. Plan for  if you have other children. Stay flexible so you can eat at odd hours and sleep when you need to. Both you and your baby are making new schedules. Plan small trips to get out of the house. Change can make you feel less tired. Ask for help with housework, cooking, and shopping. Remind yourself that your job is to care for your baby. Know about help for postpartum depression  \"Baby blues\" are common for the first 1 to 2 weeks after birth. You may cry or feel sad or irritable for no reason. Rest whenever you can. Being tired makes it harder to handle your emotions. Go for walks with your baby. Talk to your partner, friends, and family about your feelings. If your symptoms last for more than a few weeks, or if you feel very depressed, ask your doctor for help. Postpartum depression can be treated. Support groups and counseling can help. Sometimes medicine can also help. Stay healthy  Eat healthy foods so you have more energy. If you breastfeed, avoid drugs. If you quit smoking during pregnancy, try to stay smoke-free. If you choose to have a drink now and then, have only one drink, and limit the number of occasions that you have a drink. Wait to breastfeed at least 2 hours after you have a drink to reduce the amount of alcohol the baby may get in the milk. Start daily exercise after 4 to 6 weeks, but rest when you feel tired.   Learn exercises to tone your belly. Try Kegel exercises to regain strength in your pelvic muscles. You can do these exercises while you stand or sit. (If doing these exercises causes pain, stop doing them and talk with your doctor.)  Squeeze your muscles as if you were trying not to pass gas. Or squeeze your muscles as if you were stopping the flow of urine. Your belly, legs, and buttocks shouldn't move. Hold the squeeze for 3 seconds, then relax for 5 to 10 seconds. Start with 3 seconds, then add 1 second each week until you are able to squeeze for 10 seconds. Repeat the exercise 10 times a session. Do 3 to 8 sessions a day. Find a class for you and your baby that has an exercise time. If you had a  birth, give yourself a bit more time before you exercise, and be careful. When should you call for help? Share this information with your partner, family, or a friend. They can help you watch for warning signs. Call 911  anytime you think you may need emergency care. For example, call if:    You have thoughts of harming yourself, your baby, or another person. You passed out (lost consciousness). You have chest pain, are short of breath, or cough up blood. You have a seizure. Call your doctor now or seek immediate medical care if:    You have signs of hemorrhage (too much bleeding), such as:  Heavy vaginal bleeding. This means that you are soaking through one or more pads in an hour. Or you pass blood clots bigger than an egg. Feeling dizzy or lightheaded, or you feel like you may faint. Feeling so tired or weak that you cannot do your usual activities. A fast or irregular heartbeat. New or worse belly pain. You have signs of infection, such as:  A fever. Vaginal discharge that smells bad. New or worse belly pain. You have symptoms of a blood clot in your leg (called a deep vein thrombosis), such as:  Pain in the calf, back of the knee, thigh, or groin. Redness and swelling in your leg or groin. You have signs of preeclampsia, such as:  Sudden swelling of your face, hands, or feet. New vision problems (such as dimness, blurring, or seeing spots). A severe headache. Watch closely for changes in your health, and be sure to contact your doctor if:    Your vaginal bleeding isn't decreasing. You feel sad, anxious, or hopeless for more than a few days. You are having problems with your breasts or breastfeeding. Where can you learn more? Go to https://Tracelyticspecristianeb.[employer] Monitoring. org and sign in to your [employer] account. Enter Q369 in the [employer] box to learn more about \"After Your Delivery (the Postpartum Period): Care Instructions. \"     If you do not have an account, please click on the \"Sign Up Now\" link. Current as of: February 23, 2022               Content Version: 13.4  © 0000-5248 Healthwise, Incorporated. Care instructions adapted under license by [facility] ([facility]). If you have questions about a medical condition or this instruction, always ask your healthcare professional. [address] any warranty or liability for your use of this information.

## 2022-12-04 PROBLEM — O24.419 GESTATIONAL DIABETES: Status: RESOLVED | Noted: 2022-12-04 | Resolved: 2022-12-04

## 2022-12-04 PROBLEM — O26.93 COMPLICATION OF PREGNANCY, THIRD TRIMESTER: Status: RESOLVED | Noted: 2022-12-01 | Resolved: 2022-12-04

## 2022-12-04 PROBLEM — O14.10 SEVERE PREECLAMPSIA: Status: RESOLVED | Noted: 2022-12-04 | Resolved: 2022-12-04

## 2022-12-04 PROBLEM — Z34.93 NORMAL PREGNANCY IN THIRD TRIMESTER: Status: RESOLVED | Noted: 2022-12-01 | Resolved: 2022-12-04

## 2022-12-04 PROBLEM — O14.10 SEVERE PREECLAMPSIA: Status: ACTIVE | Noted: 2022-12-04

## 2022-12-04 PROBLEM — Z34.90 ENCOUNTER FOR INDUCTION OF LABOR: Status: RESOLVED | Noted: 2022-12-03 | Resolved: 2022-12-04

## 2022-12-04 PROBLEM — O24.419 GESTATIONAL DIABETES: Status: ACTIVE | Noted: 2022-12-04

## 2022-12-04 LAB
ALBUMIN SERPL-MCNC: 2.9 G/DL (ref 3.5–5.2)
ALP BLD-CCNC: 104 U/L (ref 35–104)
ALT SERPL-CCNC: 51 U/L (ref 0–32)
ANION GAP SERPL CALCULATED.3IONS-SCNC: 7 MMOL/L (ref 7–16)
AST SERPL-CCNC: 30 U/L (ref 0–31)
BILIRUB SERPL-MCNC: 0.5 MG/DL (ref 0–1.2)
BUN BLDV-MCNC: 3 MG/DL (ref 6–20)
CALCIUM SERPL-MCNC: 6.7 MG/DL (ref 8.6–10.2)
CHLORIDE BLD-SCNC: 103 MMOL/L (ref 98–107)
CO2: 23 MMOL/L (ref 22–29)
CREAT SERPL-MCNC: 0.6 MG/DL (ref 0.5–1)
GFR SERPL CREATININE-BSD FRML MDRD: >60 ML/MIN/1.73
GLUCOSE BLD-MCNC: 77 MG/DL (ref 74–99)
HCT VFR BLD CALC: 27.4 % (ref 34–48)
HEMOGLOBIN: 9.3 G/DL (ref 11.5–15.5)
MAGNESIUM: 4.3 MG/DL (ref 1.6–2.6)
MAGNESIUM: 4.5 MG/DL (ref 1.6–2.6)
MCH RBC QN AUTO: 31.2 PG (ref 26–35)
MCHC RBC AUTO-ENTMCNC: 33.9 % (ref 32–34.5)
MCV RBC AUTO: 91.9 FL (ref 80–99.9)
METER GLUCOSE: 79 MG/DL (ref 74–99)
PDW BLD-RTO: 12.9 FL (ref 11.5–15)
PLATELET # BLD: 183 E9/L (ref 130–450)
PMV BLD AUTO: 11.4 FL (ref 7–12)
POTASSIUM SERPL-SCNC: 3.5 MMOL/L (ref 3.5–5)
RBC # BLD: 2.98 E12/L (ref 3.5–5.5)
SODIUM BLD-SCNC: 133 MMOL/L (ref 132–146)
TOTAL PROTEIN: 5.1 G/DL (ref 6.4–8.3)
WBC # BLD: 10.4 E9/L (ref 4.5–11.5)

## 2022-12-04 PROCEDURE — 85027 COMPLETE CBC AUTOMATED: CPT

## 2022-12-04 PROCEDURE — 80053 COMPREHEN METABOLIC PANEL: CPT

## 2022-12-04 PROCEDURE — 6370000000 HC RX 637 (ALT 250 FOR IP): Performed by: LEGAL MEDICINE

## 2022-12-04 PROCEDURE — 1220000001 HC SEMI PRIVATE L&D R&B

## 2022-12-04 PROCEDURE — 36415 COLL VENOUS BLD VENIPUNCTURE: CPT

## 2022-12-04 PROCEDURE — 6360000002 HC RX W HCPCS: Performed by: LEGAL MEDICINE

## 2022-12-04 PROCEDURE — 82962 GLUCOSE BLOOD TEST: CPT

## 2022-12-04 PROCEDURE — 83735 ASSAY OF MAGNESIUM: CPT

## 2022-12-04 PROCEDURE — 99232 SBSQ HOSP IP/OBS MODERATE 35: CPT | Performed by: STUDENT IN AN ORGANIZED HEALTH CARE EDUCATION/TRAINING PROGRAM

## 2022-12-04 RX ADMIN — DOCUSATE SODIUM 100 MG: 100 CAPSULE, LIQUID FILLED ORAL at 08:12

## 2022-12-04 RX ADMIN — FERROUS SULFATE TAB 325 MG (65 MG ELEMENTAL FE) 325 MG: 325 (65 FE) TAB at 08:12

## 2022-12-04 RX ADMIN — OXYCODONE 5 MG: 5 TABLET ORAL at 18:06

## 2022-12-04 RX ADMIN — OXYCODONE 5 MG: 5 TABLET ORAL at 03:27

## 2022-12-04 RX ADMIN — FERROUS SULFATE TAB 325 MG (65 MG ELEMENTAL FE) 325 MG: 325 (65 FE) TAB at 18:06

## 2022-12-04 RX ADMIN — HYDRALAZINE HYDROCHLORIDE 10 MG: 10 TABLET, FILM COATED ORAL at 06:40

## 2022-12-04 RX ADMIN — DOCUSATE SODIUM 100 MG: 100 CAPSULE, LIQUID FILLED ORAL at 21:03

## 2022-12-04 RX ADMIN — MAGNESIUM SULFATE HEPTAHYDRATE 1000 MG/HR: 40 INJECTION, SOLUTION INTRAVENOUS at 01:54

## 2022-12-04 ASSESSMENT — PAIN - FUNCTIONAL ASSESSMENT: PAIN_FUNCTIONAL_ASSESSMENT: ACTIVITIES ARE NOT PREVENTED

## 2022-12-04 ASSESSMENT — PAIN SCALES - GENERAL
PAINLEVEL_OUTOF10: 7
PAINLEVEL_OUTOF10: 7

## 2022-12-04 ASSESSMENT — PAIN DESCRIPTION - DESCRIPTORS
DESCRIPTORS: CRAMPING
DESCRIPTORS: ACHING

## 2022-12-04 ASSESSMENT — PAIN DESCRIPTION - LOCATION: LOCATION: HEAD

## 2022-12-04 NOTE — PROGRESS NOTES
Hearing screening results were discussed with parent. Questions answered. Brochure given to parent. Advised to monitor developmental milestones and contact physician for any concerns.    Electronically signed by Robby Cervantes on 12/4/2022 at 2:16 PM

## 2022-12-04 NOTE — PLAN OF CARE
Problem: Respiratory - Adult  Goal: Achieves optimal ventilation and oxygenation  Outcome: Progressing     Problem: Gastrointestinal - Adult  Goal: Minimal or absence of nausea and vomiting  Outcome: Progressing  Goal: Maintains adequate nutritional intake  Outcome: Progressing     Problem: Infection - Adult  Goal: Absence of infection during hospitalization  Outcome: Progressing  Goal: Absence of fever/infection during anticipated neutropenic period  Outcome: Progressing     Problem: Pain  Goal: Verbalizes/displays adequate comfort level or baseline comfort level  Outcome: Progressing     Problem: Safety - Adult  Goal: Free from fall injury  Outcome: Progressing     Problem: Skin/Tissue Integrity  Goal: Absence of new skin breakdown  Description: 1. Monitor for areas of redness and/or skin breakdown  2. Assess vascular access sites hourly  3. Every 4-6 hours minimum:  Change oxygen saturation probe site  4. Every 4-6 hours:  If on nasal continuous positive airway pressure, respiratory therapy assess nares and determine need for appliance change or resting period.   Outcome: Progressing

## 2022-12-04 NOTE — DISCHARGE SUMMARY
[address]                               DISCHARGE SUMMARY    PATIENT NAME: Ivonne Justice             :        1985  MED REC NO:   [id number]                            ROOM:       LD04  ACCOUNT NO:   [de-identified]                           ADMIT DATE: 2022  PROVIDER:     King Kent MD                  DISCHARGE DATE:    ADMISSION DIAGNOSIS:  Intrauterine pregnancy at 37-1/2 weeks with chest  pain. DISCHARGE DIAGNOSIS:  Preeclampsia with severe features. PROCEDURE:  Cervical ripening, induction, vaginal delivery. Administration of IV magnesium. HOSPITAL COURSE IN DETAIL:  The patient is doing well. Her chest pain  resolved 24 hours after admission. Liver functions initially were  climbing, but now are decreasing. She denies any PIH symptoms. She  feels well. Her admission labs had revealed a creatinine of 0.60, ALT  52, AST 39, glucose 65. Urine drug screen positive for marijuana. White count 6.6, hemoglobin 9.9, platelets 873,677. 24-hour urine with  240 mg of protein. 12 hours later her ALT was 53, AST was 41.  24  hours later, ALT was 63, AST was 38.  12 hours after that, ALT was 77. AST was 46. Labs this morning revealed a creatinine of 0.6, ALT 61, AST  30, glucose 77. White count 10.4, hemoglobin 9.3, platelets 301,967. She is afebrile. Heart rate is 64-73. Blood pressures ranged from  137-159 over 76-94. Her blood pressures got all the way up to 169/92  and 167/109. O2 sat is 98% on room air. Urine output is 1600 to 525 mL  per shift. She is behind 440 mL. Lungs are clear to auscultation. CV:  Regular rate and rhythm. Fundus is firm and two fingerbreadths below  the umbilicus. Abdomen is soft, nondistended, nontender. No rebound or  guarding. Normal bowel sounds are present. Perineum dry and intact.    Extremities:  No clubbing, cyanosis, edema, cords or erythema. ASSESSMENT:  The patient is doing well, postpartum day #1. Diuresing  well. Clinically improved with decreasing liver functions. Stable. PLAN:  We will continue IV magnesium for 24 hours. Home once meets  discharge criteria with fever, bleeding, emboli, lifting, climbing,  driving precautions. She is to follow up at the office in 6 weeks. She  indicates understanding of all instructions. Lexy Huynh MD    D: 12/04/2022 9:49:12       T: 12/04/2022 9:51:27     [email or web address]  Job#: [id number]     Doc#: [id number]    CC:  She will follow up in the office in 1 week, not 6 weeks, once meets discharge criteria and able to go home.

## 2022-12-04 NOTE — PROGRESS NOTES
[address] [place], [place] [address]   Progress Note    Admitting Date and Time: 12/1/2022  8:45 AM  Admit Dx: Normal pregnancy in third trimester [E12.56]  Complication of pregnancy, third trimester [O26.93]  Encounter for induction of labor [Z34.90]    Subjective:    Pt still chest pain free, no SOB or palpitations. Still doing well after delivery yesterday.      Per RN: BP has been borderline high still, will be on magnesium sulfate until 140pm today    ROS: denies fever, chills, cp, sob, n/v, HA unless stated above.     sodium chloride flush  5-40 mL IntraVENous 2 times per day    docusate sodium  100 mg Oral BID    ferrous sulfate  325 mg Oral TID WC    measles, mumps & rubella vaccine  0.5 mL SubCUTAneous Prior to discharge    Tdap-Dtap  0.5 mL IntraMUSCular Prior to discharge     hydrALAZINE, 10 mg, Q6H PRN  sodium chloride flush, 5-40 mL, PRN  sodium chloride, , PRN  oxyCODONE, 5 mg, Q6H PRN   Or  oxyCODONE, 10 mg, Q4H PRN  lanolin, , PRN  witch hazel-glycerin, , PRN  benzocaine-menthol, , PRN       Objective:    /83   Pulse 84   Temp 98.7 °F (37.1 °C) (Oral)   Resp 16   Ht 5' 2\" (1.575 m)   Wt 152 lb (68.9 kg)   SpO2 96%   Breastfeeding Unknown   BMI 27.80 kg/m²     General Appearance: alert and oriented to person, place and time and in no acute distress  Skin: warm and dry  Head: normocephalic and atraumatic  Eyes: pupils equal, round, and reactive to light, extraocular eye movements intact, conjunctivae normal  Neck: neck supple and non tender without mass   Pulmonary/Chest: clear to auscultation bilaterally- no wheezes, rales or rhonchi, normal air movement, no respiratory distress  Cardiovascular: normal rate, normal S1 and S2 and no carotid bruits  Abdomen: soft, non-tender, non-distended, normal bowel sounds, no masses or organomegaly  Extremities: no cyanosis, no clubbing and no edema  Neurologic: no cranial nerve deficit and speech normal      Recent Labs     12/03/22  0410 12/03/22  1500 12/04/22  0523    131* 133   K 3.7 3.5 3.5    95* 103   CO2 21* 22 23   BUN 7 6 3*   CREATININE 0.7 0.6 0.6   GLUCOSE 75 107* 77   CALCIUM 8.4* 7.5* 6.7*       Recent Labs     12/03/22  0410 12/03/22  1500 12/04/22  0523   ALKPHOS 115* 146* 104   PROT 6.0* 7.4 5.1*   LABALBU 3.4* 3.9 2.9*   BILITOT 0.7 0.8 0.5   AST 38* 46* 30   ALT 63* 77* 51*       Recent Labs     12/01/22  1835 12/03/22  0410 12/04/22  0523   WBC 6.1 5.4 10.4   RBC 3.02* 3.41* 2.98*   HGB 9.5* 10.4* 9.3*   HCT 28.0* 31.7* 27.4*   MCV 92.7 93.0 91.9   MCH 31.5 30.5 31.2   MCHC 33.9 32.8 33.9   RDW 13.1 13.0 12.9    196 183   MPV 11.0 10.5 11.4         Radiology:   US DUP LOWER EXTREMITIES BILATERAL VENOUS   Final Result   No evidence of DVT in either lower extremity. US LIVER   Final Result   1. Normal appearance of the imaged liver. No hepatic mass or evidence of   intrahepatic ductal dilatation. The extrahepatic common bile duct appears   within normal limits measuring 3 mm. 2.  Gallbladder sludge. There are few sub 5 mm gallbladder polyps. 3.  Normal appearance of the right kidney. Mild right hydronephrosis. RECOMMENDATIONS:   Recommend routine follow-up gallbladder ultrasound in 6 months to reassess   the polyps. US FETAL BIOPHYSICAL PROFILE WO NON STRESS TESTING   Final Result   1. Normal biophysical profile, 8 of 8.      2.  Fetal heart rate was 152 beats per minute. Cephalic presentation with no   evidence of previa. Normal amniotic fluid which measured 12.8 cm. Assessment:  Principal Problem (Resolved):    Normal pregnancy in third trimester  Active Problems:    * No active hospital problems.  *  Resolved Problems:    Complication of pregnancy, third trimester    Encounter for induction of labor    Severe preeclampsia    Gestational diabetes      Plan:  Chest pain  -EKG, CBC, CMP, LDH, b/l LE duplex ordered by primary team  -EKG no acute ST changes, troponin negative, chest pain resolved  -Rapid COVID and flu negative as well  -Vitals stable, monitor on telemetry  -Ok to discharge from medicine POV when ok with primary     Elevated LFTs  -Alk phos 115, was 50 in August, improving today  -ALT and AST mildly elevated as well, improving today  -Liver US showed normal liver, gallbladder polyps that should be followed up in 6 months to re-assess per radiology     Pregnancy  -Patient 37 weeks gestation, induced and delivered yesterday and receiving magnesium sulfate as patient with mildly elevated liver enzymes, elevated BP and did have proteinuria  -Management by primary team, obgyn     NOTE: This report was transcribed using voice recognition software. Every effort was made to ensure accuracy; however, inadvertent computerized transcription errors may be present.      Electronically signed by Maximiliano Mckeon MD on 12/4/2022 at 3:43 PM

## 2022-12-05 VITALS
HEIGHT: 62 IN | OXYGEN SATURATION: 99 % | BODY MASS INDEX: 27.97 KG/M2 | RESPIRATION RATE: 18 BRPM | WEIGHT: 152 LBS | TEMPERATURE: 98.1 F | SYSTOLIC BLOOD PRESSURE: 139 MMHG | DIASTOLIC BLOOD PRESSURE: 71 MMHG | HEART RATE: 53 BPM

## 2022-12-05 LAB
ALBUMIN SERPL-MCNC: 2.9 G/DL (ref 3.5–5.2)
ALP BLD-CCNC: 94 U/L (ref 35–104)
ALT SERPL-CCNC: 48 U/L (ref 0–32)
ANION GAP SERPL CALCULATED.3IONS-SCNC: 6 MMOL/L (ref 7–16)
AST SERPL-CCNC: 28 U/L (ref 0–31)
BILIRUB SERPL-MCNC: 0.3 MG/DL (ref 0–1.2)
BUN BLDV-MCNC: 5 MG/DL (ref 6–20)
CALCIUM SERPL-MCNC: 7.7 MG/DL (ref 8.6–10.2)
CHLORIDE BLD-SCNC: 105 MMOL/L (ref 98–107)
CO2: 28 MMOL/L (ref 22–29)
CREAT SERPL-MCNC: 0.7 MG/DL (ref 0.5–1)
GFR SERPL CREATININE-BSD FRML MDRD: >60 ML/MIN/1.73
GLUCOSE BLD-MCNC: 85 MG/DL (ref 74–99)
HCT VFR BLD CALC: 27.3 % (ref 34–48)
HEMOGLOBIN: 8.9 G/DL (ref 11.5–15.5)
MCH RBC QN AUTO: 30.6 PG (ref 26–35)
MCHC RBC AUTO-ENTMCNC: 32.6 % (ref 32–34.5)
MCV RBC AUTO: 93.8 FL (ref 80–99.9)
PDW BLD-RTO: 13.1 FL (ref 11.5–15)
PLATELET # BLD: 188 E9/L (ref 130–450)
PMV BLD AUTO: 10.9 FL (ref 7–12)
POTASSIUM SERPL-SCNC: 4 MMOL/L (ref 3.5–5)
RBC # BLD: 2.91 E12/L (ref 3.5–5.5)
SODIUM BLD-SCNC: 139 MMOL/L (ref 132–146)
TOTAL PROTEIN: 5.2 G/DL (ref 6.4–8.3)
WBC # BLD: 6.9 E9/L (ref 4.5–11.5)

## 2022-12-05 PROCEDURE — 85027 COMPLETE CBC AUTOMATED: CPT

## 2022-12-05 PROCEDURE — 36415 COLL VENOUS BLD VENIPUNCTURE: CPT

## 2022-12-05 PROCEDURE — 6370000000 HC RX 637 (ALT 250 FOR IP): Performed by: LEGAL MEDICINE

## 2022-12-05 PROCEDURE — 80053 COMPREHEN METABOLIC PANEL: CPT

## 2022-12-05 RX ADMIN — OXYCODONE 5 MG: 5 TABLET ORAL at 08:02

## 2022-12-05 RX ADMIN — FERROUS SULFATE TAB 325 MG (65 MG ELEMENTAL FE) 325 MG: 325 (65 FE) TAB at 08:02

## 2022-12-05 RX ADMIN — DOCUSATE SODIUM 100 MG: 100 CAPSULE, LIQUID FILLED ORAL at 08:02

## 2022-12-05 ASSESSMENT — PAIN SCALES - GENERAL: PAINLEVEL_OUTOF10: 8

## 2022-12-05 ASSESSMENT — PAIN - FUNCTIONAL ASSESSMENT: PAIN_FUNCTIONAL_ASSESSMENT: ACTIVITIES ARE NOT PREVENTED

## 2022-12-05 ASSESSMENT — PAIN DESCRIPTION - DESCRIPTORS: DESCRIPTORS: CRAMPING;SORE

## 2022-12-05 ASSESSMENT — PAIN DESCRIPTION - LOCATION: LOCATION: ABDOMEN

## 2022-12-05 NOTE — OP NOTE
[address]                                OPERATIVE REPORT    PATIENT NAME: Alison Guerrero             :        1985  MED REC NO:   [id number]                            ROOM:       04  ACCOUNT NO:   [de-identified]                           ADMIT DATE: 2022. .. PROVIDER:     Yany Moreno MD    DATE OF PROCEDURE:  2022    SURGEON:  Yany Moreno MD    The patient felt the urge to push. She was placed in [place]  position. She pushed for 40 minutes. She was fatigued. She requested  assistance. Cervix was complete, complete vertex, +3 occiput anterior. She was in [place] position. Bladder was empty. Verbal consent for  use of vacuum was obtained. Vacuum was placed and placement checked. Gentle upward traction allowed delivery of head after one application of  10 seconds. Fetal trunk was delivered with minimal traction applied in  an axis parallel to the fetal spine after the shoulder had cleared the  pubic bone. Nares and hypopharynx were suctioned. Delayed cord  clamping was performed. Cord was clamped and cut. Infant was crying  and vigorous. Cord gases and blood samples were obtained. Placenta was  removed spontaneously. Note was made of an intact two artery, one vein  cord placenta with a short cord which was sent to pathology. No  cervical or vaginal lacerations were noted. Fundus was firm. Estimated  blood loss was 100 mL. Bedside exam of the infant revealed no gross  abnormalities. The cord arterial gas pH is 7.293, base excess -1.6. Cord venous gas pH 7.376, base excess -3.3. Pediatrician is Floyd Carlson. Mother and infant went to recovery room in stable condition.           Abraham Saunders MD    D: 2022 9:58:58       T: 2022 10:01:19     [email or web address]  Job#: [id number]     Doc#: [id number]    CC:

## 2022-12-05 NOTE — PROGRESS NOTES
Assumed care of patient for 7 pm - 7 am shift. Plan of care discussed and agreed upon. Assessment completed and charted. All needs addressed. Call light with in reach.

## 2022-12-05 NOTE — CARE COORDINATION
2022: SS Note:  SS Consult noted regarding \"maternal drug use\", pt's UDS on delivery positive for Cannabinoid, 's UDS negative for any illicit drugs, + for Fentanyl from mother of baby's epidural, met with pt, introduced self and role, explained consult, pt was very pleasant and open to speaking with sw, she was holding and feeding her  son, Jama Segundo and appeared attentive and caring toward her baby, no other parenting concerns relayed by nursing staff. She reports marijuana use during her pregnancy due to [place] difficulty keeping food down and for nausea\", she denies drug dependency and does plan to stop all illicit drug use on her own, denies need to speak with PRS or need for addiction recovery resources. She reports moving her from [address] [place] [address] and to currently staying with her sister, Ashvin De Anda (ph# [phone]) who she list as her support person for plan of care, she has 5 other children, youngest 3 are in her care , 14y, 17y, 9y and 3y and her 16 y old stays with her aunt in [address] [place] [address] for school but she has full custody. She reports having all the necessary provisions to safely take her baby home and has a [facility] appointment scheduled. Sw addressed her history of Post Partum Depression, she currently denies any PPD symptoms but did accept PPD counseling resources for future reference if needed. JAILENE- assessment completed, report called to [facility] pre-screener, will review with agency supervisor but anticipate referral being screened out with no ongoing agency services and NO HOLD on  for d/c home, nursing informed. Electronically signed by DANIEL Huff on 2022 at 1:52 PM

## 2022-12-12 NOTE — DISCHARGE SUMMARY
Went home once met discharge criteria with PIH and fever, bleeding, emboli precautions. She is to follow up in the office in 1 week for BP check. She indicated understanding of all instructions.

## 2023-02-27 LAB
MEASLES IMMUNE (IGG): NORMAL
MUMPS AB IGG: NORMAL
RUBELLA ANTIBODY IGG: NORMAL
VARICELLA-ZOSTER VIRUS AB, IGG: NORMAL

## 2023-03-23 NOTE — ANESTHESIA PRE PROCEDURE
Department of Anesthesiology  Preprocedure Note       Name:  Navi Coppola   Age:  44 y.o.  :  1985                                          MRN:  [id number]         Date:  12/3/2022      Surgeon: * No surgeons listed *    Procedure: Labor Analgesia    Medications prior to admission:   Prior to Admission medications    Medication Sig Start Date End Date Taking? Authorizing Provider   FEROSUL 325 (65 Fe) MG tablet take 1 tablet by mouth twice a day 9/15/22   Historical Provider, MD   metoclopramide (REGLAN) 10 MG tablet take 1 tablet by mouth four times a day if needed 9/15/22   Historical Provider, MD FRANCO VITAMIN C 250 MG tablet take 1 tablet by mouth twice a day 9/15/22   Historical Provider, MD   progesterone (PROMETRIUM) 200 MG CAPS capsule INSERT 1 CAPSULE PER VAGINA EVERY EVENING BEFORE BED 9/15/22   Historical Provider, MD   lansoprazole (PREVACID) 15 MG delayed release capsule take 1 capsule by mouth once daily 9/15/22   Historical Provider, MD   doxylamine-pyridoxine 10-10 MG TBEC Take 2 tablets by mouth nightly 22   Historical Provider, MD       Current medications:    No current facility-administered medications for this visit. No current outpatient medications on file.      Facility-Administered Medications Ordered in Other Visits   Medication Dose Route Frequency Provider Last Rate Last Admin    lactated ringers infusion   IntraVENous Continuous Torrie Hwang MD 73 mL/hr at 22 1056 Rate Change at 22 1056    lactated ringers bolus  500 mL IntraVENous PRN Torrie Hwang MD        Or    lactated ringers bolus  1,000 mL IntraVENous PRN Torrie Hwang MD        sodium chloride flush 0.9 % injection 5-40 mL  5-40 mL IntraVENous 2 times per day Torrie Hwang MD        sodium chloride flush 0.9 % injection 5-40 mL  5-40 mL IntraVENous PRN Torrie Hwang MD        0.9 % sodium chloride infusion  25 mL IntraVENous PRN Torrie Hwang MD        methylergonovine Last OV 11/5/22  Last refilled on 1/4/23 for # 90 with 0 refills  No future appointments. Thank you. (METHERGINE) injection 200 mcg  200 mcg IntraMUSCular PRN Luke Jin MD        carboprost (HEMABATE) injection 250 mcg  250 mcg IntraMUSCular PRN Luke Jin MD        miSOPROStol (CYTOTEC) tablet 800 mcg  800 mcg Rectal PRN Luke Jin MD        tranexamic acid (CYKLOKAPRON) 1,000 mg in sodium chloride 0.9 % 100 mL IVPB  1,000 mg IntraVENous Once PRN Luke Jin MD        oxytocin (PITOCIN) 30 units in 500 mL infusion  909 john-units/min IntraVENous PRN Luke Jin MD        lidocaine PF 1 % injection 30 mL  30 mL Other PRN Luke Jin MD        hydrocortisone 2.5 % cream   Topical Q2H PRN Luke Jin MD        oxytocin (PITOCIN) 30 units in 500 mL infusion  1-20 john-units/min IntraVENous Continuous Luke Jin MD 2 mL/hr at 12/03/22 1055 2 john-units/min at 12/03/22 1055    hydrALAZINE (APRESOLINE) tablet 10 mg  10 mg Oral Q6H PRN Luke Jin MD        magnesium sulfate (95016 mg/500mL infusion)  2,000 mg/hr IntraVENous Continuous Luke Jin MD 50 mL/hr at 12/03/22 1015 2,000 mg/hr at 12/03/22 1015    calcium gluconate 10 % injection 1,000 mg  1,000 mg IntraVENous PRN Luke Jin MD        fentaNYL 1.85mcg/ml and Bupivicaine 0.1% in 0.9% NS 135ml infusion (OB) epidural  15 mL/hr Epidural Continuous Genoveva Christensen MD 15 mL/hr at 12/03/22 1036 15 mL/hr at 12/03/22 1036    naloxone 0.4 mg in 10 mL sodium chloride syringe   IntraVENous PRN Genoveva Christensen MD        nalbuphine (NUBAIN) injection 5 mg  5 mg IntraVENous Q4H PRN Genoveva Christensen MD        ondansetron [facility]) injection 4 mg  4 mg IntraVENous Q6H PRN Genoveva Christensen MD        lidocaine 1 % injection             metoclopramide (REGLAN) injection 10 mg  10 mg IntraVENous Q6H PRN Luke Jin MD   10 mg at 12/02/22 2256    ondansetron (ZOFRAN) tablet 4 mg  4 mg Oral Q8H PRN Wilhemjose Jin MD   4 mg at 12/03/22 0402    famotidine (PEPCID) 20 mg in sodium chloride (PF) 0.9 % 10 mL injection  20 mg IntraVENous BID Eligio Valero MD   20 mg at 22 6025       Allergies:  No Known Allergies    Problem List:    Patient Active Problem List   Diagnosis Code    Hereditary disease in family possibly affecting fetus,( previous baby with Clubbed feet) affecting management of mother, antepartum condition or complication G79. 2XX0    Poor fetal growth, affecting management of mother, antepartum condition or complication I06.0657    Multiparity Z64.1    37 weeks gestation of pregnancy Q4K.25    Complication of pregnancy in third trimester L86.08    Complicated pregnancy, third trimester O26.93    Normal pregnancy in third trimester V43.35    Complication of pregnancy, third trimester O26.93    Encounter for induction of labor Z34.90       Past Medical History:        Diagnosis Date    Postpartum depression        Past Surgical History:        Procedure Laterality Date    ABDOMEN SURGERY      DILATION AND CURETTAGE      TONSILLECTOMY AND ADENOIDECTOMY         Social History:    Social History     Tobacco Use    Smoking status: Former     Types: Cigarettes     Quit date: 2018     Years since quittin.2    Smokeless tobacco: Never   Substance Use Topics    Alcohol use: No                                Counseling given: Not Answered      Vital Signs (Current): There were no vitals filed for this visit.                                            BP Readings from Last 3 Encounters:   22 (!) 153/90   10/22/22 120/70   10/08/22 128/71       NPO Status:                                                                                 BMI:   Wt Readings from Last 3 Encounters:   22 152 lb (68.9 kg)   22 141 lb (64 kg)   22 125 lb (56.7 kg)     There is no height or weight on file to calculate BMI.    CBC:   Lab Results   Component Value Date/Time    WBC 5.4 2022 04:10 AM    RBC 3.41 2022 04:10 AM    HGB 10.4 2022 04:10 AM    HCT 31.7 2022 04:10 AM    MCV 93.0 12/03/2022 04:10 AM    RDW 13.0 12/03/2022 04:10 AM     12/03/2022 04:10 AM       CMP:   Lab Results   Component Value Date/Time     12/03/2022 04:10 AM    K 3.7 12/03/2022 04:10 AM    K 3.5 08/16/2022 04:05 PM     12/03/2022 04:10 AM    CO2 21 12/03/2022 04:10 AM    BUN 7 12/03/2022 04:10 AM    CREATININE 0.7 12/03/2022 04:10 AM    GFRAA >60 08/16/2022 04:05 PM    LABGLOM >60 12/03/2022 04:10 AM    GLUCOSE 75 12/03/2022 04:10 AM    PROT 6.0 12/03/2022 04:10 AM    CALCIUM 8.4 12/03/2022 04:10 AM    BILITOT 0.7 12/03/2022 04:10 AM    ALKPHOS 115 12/03/2022 04:10 AM    AST 38 12/03/2022 04:10 AM    ALT 63 12/03/2022 04:10 AM       POC Tests: No results for input(s): POCGLU, POCNA, POCK, POCCL, POCBUN, POCHEMO, POCHCT in the last 72 hours. Coags: No results found for: PROTIME, INR, APTT    HCG (If Applicable):   Lab Results   Component Value Date    PREGTESTUR NEGATIVE 07/14/2020        ABGs: No results found for: PHART, PO2ART, GSO7PJF, SQL3LTZ, BEART, Y6HBLNOQ     Type & Screen (If Applicable):  No results found for: LABABO, [address]    Anesthesia Evaluation  Patient summary reviewed and Nursing notes reviewed no history of anesthetic complications:   Airway: Mallampati: II  TM distance: >3 FB   Neck ROM: full  Mouth opening: > = 3 FB   Dental: normal exam     Comment: Pt denies chipped, loose, missing teeth    Pulmonary:Negative Pulmonary ROS breath sounds clear to auscultation      (-) not a current smoker (quit when she found out she was pregnant)                           Cardiovascular:Negative CV ROS            Rhythm: regular  Rate: normal           Beta Blocker:  Not on Beta Blocker         Neuro/Psych:   Negative Neuro/Psych ROS              GI/Hepatic/Renal:   (+) GERD: well controlled,           Endo/Other: Negative Endo/Other ROS                    Abdominal:             Vascular: negative vascular ROS.          Other Findings: pregnant            Anesthesia Plan      general, spinal and epidural     ASA 2       Induction: intravenous. Anesthetic plan and risks discussed with patient. Plan discussed with CRNA and attending.                     SHAW Nielsen - CRNA   12/3/2022

## 2024-01-23 ENCOUNTER — HOSPITAL ENCOUNTER (EMERGENCY)
Age: 39
Discharge: HOME | End: 2024-01-23
Attending: EMERGENCY MEDICINE
Payer: MEDICAID

## 2024-01-23 ENCOUNTER — APPOINTMENT (OUTPATIENT)
Dept: CT IMAGING | Age: 39
End: 2024-01-23
Attending: EMERGENCY MEDICINE
Payer: MEDICAID

## 2024-01-23 VITALS
HEART RATE: 71 BPM | OXYGEN SATURATION: 98 % | RESPIRATION RATE: 18 BRPM | SYSTOLIC BLOOD PRESSURE: 138 MMHG | DIASTOLIC BLOOD PRESSURE: 80 MMHG | TEMPERATURE: 98.1 F

## 2024-01-23 DIAGNOSIS — J06.9 UPPER RESPIRATORY TRACT INFECTION, UNSPECIFIED TYPE: ICD-10-CM

## 2024-01-23 DIAGNOSIS — R51.9 SINUS HEADACHE: Primary | ICD-10-CM

## 2024-01-23 DIAGNOSIS — J06.9 ACUTE UPPER RESPIRATORY INFECTION: ICD-10-CM

## 2024-01-23 LAB
ALBUMIN SERPL-MCNC: 4.1 G/DL (ref 3.5–5.2)
ALP SERPL-CCNC: 64 U/L (ref 35–104)
ALT SERPL-CCNC: 11 U/L (ref 0–32)
ANION GAP SERPL CALCULATED.3IONS-SCNC: 9 MMOL/L (ref 7–16)
AST SERPL-CCNC: 11 U/L (ref 0–31)
BASOPHILS # BLD: 0.02 K/UL (ref 0–0.2)
BASOPHILS NFR BLD: 1 % (ref 0–2)
BILIRUB SERPL-MCNC: 0.2 MG/DL (ref 0–1.2)
BUN SERPL-MCNC: 7 MG/DL (ref 6–20)
CALCIUM SERPL-MCNC: 8.4 MG/DL (ref 8.6–10.2)
CHLORIDE SERPL-SCNC: 105 MMOL/L (ref 98–107)
CO2 SERPL-SCNC: 27 MMOL/L (ref 22–29)
CREAT SERPL-MCNC: 0.6 MG/DL (ref 0.5–1)
EOSINOPHIL # BLD: 0.03 K/UL (ref 0.05–0.5)
EOSINOPHILS RELATIVE PERCENT: 1 % (ref 0–6)
ERYTHROCYTE [DISTWIDTH] IN BLOOD BY AUTOMATED COUNT: 12.9 % (ref 11.5–15)
FLUAV RNA RESP QL NAA+PROBE: NOT DETECTED
FLUBV RNA RESP QL NAA+PROBE: NOT DETECTED
GFR SERPL CREATININE-BSD FRML MDRD: >60 ML/MIN/1.73M2
GLUCOSE SERPL-MCNC: 82 MG/DL (ref 74–99)
HCG, URINE, POC: NEGATIVE
HCT VFR BLD AUTO: 40.3 % (ref 34–48)
HGB BLD-MCNC: 13.2 G/DL (ref 11.5–15.5)
IMM GRANULOCYTES # BLD AUTO: <0.03 K/UL (ref 0–0.58)
IMM GRANULOCYTES NFR BLD: 0 % (ref 0–5)
LYMPHOCYTES NFR BLD: 1.92 K/UL (ref 1.5–4)
LYMPHOCYTES RELATIVE PERCENT: 46 % (ref 20–42)
Lab: NORMAL
MCH RBC QN AUTO: 29.7 PG (ref 26–35)
MCHC RBC AUTO-ENTMCNC: 32.8 G/DL (ref 32–34.5)
MCV RBC AUTO: 90.8 FL (ref 80–99.9)
MONOCYTES NFR BLD: 0.23 K/UL (ref 0.1–0.95)
MONOCYTES NFR BLD: 6 % (ref 2–12)
NEGATIVE QC PASS/FAIL: NORMAL
NEUTROPHILS NFR BLD: 47 % (ref 43–80)
NEUTS SEG NFR BLD: 1.95 K/UL (ref 1.8–7.3)
PLATELET # BLD AUTO: 204 K/UL (ref 130–450)
PMV BLD AUTO: 10.8 FL (ref 7–12)
POSITIVE QC PASS/FAIL: NORMAL
POTASSIUM SERPL-SCNC: 3.9 MMOL/L (ref 3.5–5)
PROT SERPL-MCNC: 7 G/DL (ref 6.4–8.3)
RBC # BLD AUTO: 4.44 M/UL (ref 3.5–5.5)
SARS-COV-2 RNA RESP QL NAA+PROBE: NOT DETECTED
SODIUM SERPL-SCNC: 141 MMOL/L (ref 132–146)
SOURCE: NORMAL
SPECIMEN DESCRIPTION: NORMAL
WBC OTHER # BLD: 4.2 K/UL (ref 4.5–11.5)

## 2024-01-23 PROCEDURE — 96374 THER/PROPH/DIAG INJ IV PUSH: CPT

## 2024-01-23 PROCEDURE — 2580000003 HC RX 258: Performed by: EMERGENCY MEDICINE

## 2024-01-23 PROCEDURE — 96361 HYDRATE IV INFUSION ADD-ON: CPT

## 2024-01-23 PROCEDURE — 87636 SARSCOV2 & INF A&B AMP PRB: CPT

## 2024-01-23 PROCEDURE — 80053 COMPREHEN METABOLIC PANEL: CPT

## 2024-01-23 PROCEDURE — 6360000002 HC RX W HCPCS: Performed by: EMERGENCY MEDICINE

## 2024-01-23 PROCEDURE — 85025 COMPLETE CBC W/AUTO DIFF WBC: CPT

## 2024-01-23 PROCEDURE — 70450 CT HEAD/BRAIN W/O DYE: CPT

## 2024-01-23 PROCEDURE — 96375 TX/PRO/DX INJ NEW DRUG ADDON: CPT

## 2024-01-23 PROCEDURE — 99284 EMERGENCY DEPT VISIT MOD MDM: CPT

## 2024-01-23 RX ORDER — METOCLOPRAMIDE HYDROCHLORIDE 5 MG/ML
10 INJECTION INTRAMUSCULAR; INTRAVENOUS ONCE
Status: COMPLETED | OUTPATIENT
Start: 2024-01-23 | End: 2024-01-23

## 2024-01-23 RX ORDER — DIPHENHYDRAMINE HYDROCHLORIDE 50 MG/ML
25 INJECTION INTRAMUSCULAR; INTRAVENOUS ONCE
Status: COMPLETED | OUTPATIENT
Start: 2024-01-23 | End: 2024-01-23

## 2024-01-23 RX ORDER — 0.9 % SODIUM CHLORIDE 0.9 %
1000 INTRAVENOUS SOLUTION INTRAVENOUS ONCE
Status: COMPLETED | OUTPATIENT
Start: 2024-01-23 | End: 2024-01-23

## 2024-01-23 RX ADMIN — SODIUM CHLORIDE 1000 ML: 9 INJECTION, SOLUTION INTRAVENOUS at 13:43

## 2024-01-23 RX ADMIN — METOCLOPRAMIDE 10 MG: 5 INJECTION, SOLUTION INTRAMUSCULAR; INTRAVENOUS at 13:44

## 2024-01-23 RX ADMIN — DIPHENHYDRAMINE HYDROCHLORIDE 25 MG: 50 INJECTION INTRAMUSCULAR; INTRAVENOUS at 13:44

## 2024-01-23 ASSESSMENT — ENCOUNTER SYMPTOMS
EYES NEGATIVE: 1
ALLERGIC/IMMUNOLOGIC NEGATIVE: 1
SINUS PRESSURE: 1
SINUS PAIN: 1
NAUSEA: 1
RHINORRHEA: 1
COUGH: 1

## 2024-01-23 NOTE — ED PROVIDER NOTES
[facility] EMERGENCY DEPARTMENT  EMERGENCY DEPARTMENT ENCOUNTER        Pt Name: Rita Kate  MRN: [id number]  Birthdate 1985  Date of evaluation: 1/23/2024  Provider: Dominik Levy MD  PCP: Myranda Mcdaniel MD  Note Started: 1:06 PM EST 1/23/24    CHIEF COMPLAINT       Chief Complaint   Patient presents with    Headache     Frontal- blurred vision and nausea accompanying       HISTORY OF PRESENT ILLNESS: 1 or more Elements   History From: PATIENT     Rita Kate is a 38 y.o. female w/ PMH of GERD, post-partum depression, DINORA who presents to the ED for complaints of headache, nausea, cough, yellow mucus production, blurry vision and sinus pressure that started approximately 1 week ago, but has been getting worse over the last 3 days. Per patient, she tried Aleve which didn't improve her headache. She cannot detail much Aleve she took. Pain is located mainly in bilateral temporal region as well as the ethmoid sinus, headache has intensity of 7/10, sharp in nature, since it started it hasn't progressed. It's the same she said. Non radiating in nature. Headache gets worse with loud sound, improves with quiet room and darkness as well as sleep. She denies neck stiffness. Per patient patient the headache does not wake her up from sleep. She denies fever, chills, but reports blurry vision that started approximately 3 days ago. No eye pain though. No eye redness. Per patient, she was told 1 year ago that she has eye issues and should wear glasses to read and drive but hasn't picked up her glasses yet. 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 She also endorses yellow mucus production as well as cough and rhinorrhea.       Nursing Notes were all reviewed and agreed with or any disagreements were addressed in the HPI.    REVIEW OF SYSTEMS :      Review of Systems   Constitutional: Negative.    HENT:  Positive for rhinorrhea, sinus pressure and sinus pain.

## 2024-01-23 NOTE — ED PROVIDER NOTES
HPI:  1/23/24, Time: 3:51 PM BELLA Kate is a 38 y.o. female presenting to the ED for headache beginning 3 days ago.  Reports associated blurred vision and nausea.  She took an Aleve prior to arrival with minimal relief.  She states she has had a 1 week history of yellow nasal discharge and nasal congestion.  No fevers.  No falls or trauma to her head.  She is not anticoagulated.  She also reports a nonproductive cough.  She denies chest pain or shortness of breath.    --------------------------------------------- PAST HISTORY ---------------------------------------------  Past Medical History:  has a past medical history of Gestational diabetes, Postpartum depression, and Severe preeclampsia.    Past Surgical History:  has a past surgical history that includes Dilation & curettage; Tonsillectomy and adenoidectomy; and Abdomen surgery.    Social History:  reports that she quit smoking about 5 years ago. Her smoking use included cigarettes. She started smoking about 3 weeks ago. She smoked an average 0.5 packs per day for 0.1 years. She has never used smokeless tobacco. She reports current drug use. Drug: Marijuana (Weed). She reports that she does not drink alcohol.    Family History: family history includes Breast Cancer in her paternal grandmother; Hypertension in her father.     The patient’s home medications have been reviewed.    Allergies: Patient has no known allergies.    -------------------------------------------------- RESULTS -------------------------------------------------  All laboratory and radiology results have been personally reviewed by myself   LABS:  Results for orders placed or performed during the hospital encounter of 01/23/24   COVID-19 & Influenza Combo    Specimen: Nasopharyngeal Swab   Result Value Ref Range    Specimen Description .NASOPHARYNGEAL SWAB     Source .NASOPHARYNGEAL SWAB     SARS-CoV-2 RNA, RT PCR Not Detected Not Detected    INFLUENZA A Not Detected

## 2025-07-10 ENCOUNTER — APPOINTMENT (OUTPATIENT)
Dept: GENERAL RADIOLOGY | Age: 40
End: 2025-07-10
Payer: MEDICAID

## 2025-07-10 ENCOUNTER — HOSPITAL ENCOUNTER (EMERGENCY)
Age: 40
Discharge: HOME | End: 2025-07-10
Payer: MEDICAID

## 2025-07-10 VITALS
HEIGHT: 62 IN | OXYGEN SATURATION: 95 % | HEART RATE: 57 BPM | WEIGHT: 126 LBS | TEMPERATURE: 97.9 F | SYSTOLIC BLOOD PRESSURE: 156 MMHG | RESPIRATION RATE: 18 BRPM | DIASTOLIC BLOOD PRESSURE: 98 MMHG | BODY MASS INDEX: 23.19 KG/M2

## 2025-07-10 DIAGNOSIS — R07.89 ATYPICAL CHEST PAIN: Primary | ICD-10-CM

## 2025-07-10 DIAGNOSIS — K21.9 GASTROESOPHAGEAL REFLUX DISEASE WITHOUT ESOPHAGITIS: ICD-10-CM

## 2025-07-10 LAB
ALBUMIN SERPL-MCNC: 3.9 G/DL (ref 3.5–5.2)
ALP SERPL-CCNC: 57 U/L (ref 35–104)
ALT SERPL-CCNC: 20 U/L (ref 0–35)
ANION GAP SERPL CALCULATED.3IONS-SCNC: 8 MMOL/L (ref 7–16)
AST SERPL-CCNC: 17 U/L (ref 0–35)
BASOPHILS # BLD: 0.04 K/UL (ref 0–0.2)
BASOPHILS NFR BLD: 1 % (ref 0–2)
BILIRUB SERPL-MCNC: 0.2 MG/DL (ref 0–1.2)
BUN SERPL-MCNC: 12 MG/DL (ref 6–20)
CALCIUM SERPL-MCNC: 8.6 MG/DL (ref 8.6–10)
CHLORIDE SERPL-SCNC: 107 MMOL/L (ref 98–107)
CO2 SERPL-SCNC: 26 MMOL/L (ref 22–29)
CREAT SERPL-MCNC: 0.8 MG/DL (ref 0.5–1)
EKG ATRIAL RATE: 52 BPM
EKG P AXIS: -21 DEGREES
EKG P-R INTERVAL: 154 MS
EKG Q-T INTERVAL: 452 MS
EKG QRS DURATION: 84 MS
EKG QTC CALCULATION (BAZETT): 420 MS
EKG R AXIS: 60 DEGREES
EKG T AXIS: 51 DEGREES
EKG VENTRICULAR RATE: 52 BPM
EOSINOPHIL # BLD: 0.11 K/UL (ref 0.05–0.5)
EOSINOPHILS RELATIVE PERCENT: 2 % (ref 0–6)
ERYTHROCYTE [DISTWIDTH] IN BLOOD BY AUTOMATED COUNT: 13.2 % (ref 11.5–15)
GFR, ESTIMATED: >90 ML/MIN/1.73M2
GLUCOSE SERPL-MCNC: 102 MG/DL (ref 74–99)
HCG, URINE, POC: NEGATIVE
HCT VFR BLD AUTO: 36.2 % (ref 34–48)
HGB BLD-MCNC: 12.1 G/DL (ref 11.5–15.5)
IMM GRANULOCYTES # BLD AUTO: <0.03 K/UL (ref 0–0.58)
IMM GRANULOCYTES NFR BLD: 0 % (ref 0–5)
LACTATE BLDV-SCNC: 0.6 MMOL/L (ref 0.5–2.2)
LIPASE SERPL-CCNC: 27 U/L (ref 13–60)
LYMPHOCYTES NFR BLD: 1.84 K/UL (ref 1.5–4)
LYMPHOCYTES RELATIVE PERCENT: 31 % (ref 20–42)
Lab: NORMAL
MAGNESIUM SERPL-MCNC: 2 MG/DL (ref 1.6–2.6)
MCH RBC QN AUTO: 30.6 PG (ref 26–35)
MCHC RBC AUTO-ENTMCNC: 33.4 G/DL (ref 32–34.5)
MCV RBC AUTO: 91.4 FL (ref 80–99.9)
MONOCYTES NFR BLD: 0.31 K/UL (ref 0.1–0.95)
MONOCYTES NFR BLD: 5 % (ref 2–12)
NEGATIVE QC PASS/FAIL: NORMAL
NEUTROPHILS NFR BLD: 61 % (ref 43–80)
NEUTS SEG NFR BLD: 3.57 K/UL (ref 1.8–7.3)
PLATELET # BLD AUTO: 202 K/UL (ref 130–450)
PMV BLD AUTO: 10.8 FL (ref 7–12)
POSITIVE QC PASS/FAIL: NORMAL
POTASSIUM SERPL-SCNC: 3.8 MMOL/L (ref 3.5–5.1)
PROT SERPL-MCNC: 6.4 G/DL (ref 6.4–8.3)
RBC # BLD AUTO: 3.96 M/UL (ref 3.5–5.5)
SODIUM SERPL-SCNC: 140 MMOL/L (ref 136–145)
TROPONIN I SERPL HS-MCNC: <6 NG/L (ref 0–14)
TROPONIN I SERPL HS-MCNC: <6 NG/L (ref 0–14)
WBC OTHER # BLD: 5.9 K/UL (ref 4.5–11.5)

## 2025-07-10 PROCEDURE — 83605 ASSAY OF LACTIC ACID: CPT

## 2025-07-10 PROCEDURE — 85025 COMPLETE CBC W/AUTO DIFF WBC: CPT

## 2025-07-10 PROCEDURE — 80053 COMPREHEN METABOLIC PANEL: CPT

## 2025-07-10 PROCEDURE — 2580000003 HC RX 258: Performed by: PHYSICIAN ASSISTANT

## 2025-07-10 PROCEDURE — 84484 ASSAY OF TROPONIN QUANT: CPT

## 2025-07-10 PROCEDURE — 6370000000 HC RX 637 (ALT 250 FOR IP): Performed by: PHYSICIAN ASSISTANT

## 2025-07-10 PROCEDURE — 83690 ASSAY OF LIPASE: CPT

## 2025-07-10 PROCEDURE — 71046 X-RAY EXAM CHEST 2 VIEWS: CPT

## 2025-07-10 PROCEDURE — 83735 ASSAY OF MAGNESIUM: CPT

## 2025-07-10 PROCEDURE — 93005 ELECTROCARDIOGRAM TRACING: CPT | Performed by: PHYSICIAN ASSISTANT

## 2025-07-10 PROCEDURE — 93010 ELECTROCARDIOGRAM REPORT: CPT | Performed by: INTERNAL MEDICINE

## 2025-07-10 PROCEDURE — 99285 EMERGENCY DEPT VISIT HI MDM: CPT

## 2025-07-10 RX ORDER — 0.9 % SODIUM CHLORIDE 0.9 %
1000 INTRAVENOUS SOLUTION INTRAVENOUS ONCE
Status: COMPLETED | OUTPATIENT
Start: 2025-07-10 | End: 2025-07-10

## 2025-07-10 RX ORDER — MAGNESIUM HYDROXIDE/ALUMINUM HYDROXICE/SIMETHICONE 120; 1200; 1200 MG/30ML; MG/30ML; MG/30ML
5 SUSPENSION ORAL EVERY 6 HOURS PRN
Qty: 200 ML | Refills: 0 | Status: SHIPPED | OUTPATIENT
Start: 2025-07-10 | End: 2025-07-20

## 2025-07-10 RX ORDER — ASPIRIN 325 MG
325 TABLET ORAL ONCE
Status: COMPLETED | OUTPATIENT
Start: 2025-07-10 | End: 2025-07-10

## 2025-07-10 RX ORDER — FAMOTIDINE 20 MG/1
20 TABLET, FILM COATED ORAL 2 TIMES DAILY
Qty: 60 TABLET | Refills: 0 | Status: SHIPPED | OUTPATIENT
Start: 2025-07-10

## 2025-07-10 RX ADMIN — ASPIRIN 325 MG: 325 TABLET ORAL at 09:18

## 2025-07-10 RX ADMIN — SODIUM CHLORIDE 1000 ML: 0.9 INJECTION, SOLUTION INTRAVENOUS at 09:19

## 2025-07-10 RX ADMIN — ALUMINUM HYDROXIDE, MAGNESIUM HYDROXIDE, AND SIMETHICONE: 1200; 120; 1200 SUSPENSION ORAL at 13:34

## 2025-07-10 ASSESSMENT — LIFESTYLE VARIABLES
HOW OFTEN DO YOU HAVE A DRINK CONTAINING ALCOHOL: NEVER
HOW MANY STANDARD DRINKS CONTAINING ALCOHOL DO YOU HAVE ON A TYPICAL DAY: PATIENT DOES NOT DRINK

## 2025-07-10 NOTE — ED PROVIDER NOTES
Independent ALISTAIR Visit.         [facility] EMERGENCY DEPARTMENT  ED  Encounter Note  Admit Date/RoomTime: 7/10/2025  1:29 PM  ED Room: PR1/MO1  NAME: Rita Kate  : 1985  MRN: [id number]  PCP: Myranda Mcdaniel MD    CHIEF COMPLAINT     Chest Pain (Mid sternal chest pain. )    HISTORY OF PRESENT ILLNESS        Rita Kate is a 39 y.o. female who presents to the ED by private vehicle for sternal chest pain since 4 AM this morning.  Patient states she woke up and had pain in the center of her chest.  Patient states it does not radiate.  Patient states it was an 8 out of 10 and is now a 2 out of 10 on the pain scale.  Patient did not take anything for this because \"I do not like to take pills.\"  Patient states has never had a cardiac stress test or any form of a workup.  Patient states she does smoke cigarettes and marijuana daily.  Patient states she has had no nausea or vomiting.  Patient has no headaches, blurry vision,palpitations, abdominal pain, fevers or chills.  Patient states when she did have a pain in her chest she had some slight shortness of breath that has since resolved.  Patient states she also did feel \"dizzy over the last few days but that is also resolved.\"  Patient denies any sick contacts or new foods, antibiotics patient is never seen a cardiologist.  Patient denies any falls or trauma.  Patient denies any hemoptysis, hematemesis, hematochezia. The complaint has been stable and are mild in severity.  Patient states she did eat tacos before bedtime.  Patient states has never had surgery to her abdomen.      HEART Score For Major Cardiac Events  (Max Score 10 Points)  HISTORY       [x]   Slightly Suspicious  0       []   Moderately Suspicious  +1       []   Highly Suspicious  +2    EK point: No ST deviation but LBBB, LVH repolarization changes (ex:digoxin);  2 points: ST deviation not due to LBBB, LVH or digoxin         []   Normal  0         Final Result   No acute process.             ED COURSE   Vitals:    Vitals:    07/10/25 0711   BP: (!) 156/98   Pulse: 57   Resp: 18   Temp: 97.9 °F (36.6 °C)   TempSrc: Oral   SpO2: 95%   Weight: 57.2 kg (126 lb)   Height: 1.575 m (5' 2\")       Patient was given the following medications:  Medications   sodium chloride 0.9 % bolus 1,000 mL (0 mLs IntraVENous Stopped 7/10/25 1128)   aspirin tablet 325 mg (325 mg Oral Given 7/10/25 0918)   aluminum & magnesium hydroxide-simethicone (MAALOX PLUS) 30 mL, lidocaine viscous hcl (XYLOCAINE) 5 mL (GI COCKTAIL) ( Oral Given 7/10/25 1334)        EKG #1:  Interpreted by emergency department attending physician unless otherwise noted.    7/10/25  Time: 0711    Rhythm: sinus bradycardia  Rate: bradycardia  Axis: normal  Conduction: normal  ST Segments: nonspecific changes  T Waves: non specific changes    Clinical Impression: sinus bradycardia  Comparison to Prior tracings:  There are no significant changes when compared to prior tracings. NO STEMI      PROCEDURES   none    REASSESSMENT   7/10/25       Time: 1350 patient educated of negative findings    CONSULTS:  None      MEDICAL DECISION MAKING:   I considered, but did not perform, additional testing such CT Angiogram, as well as admission or transfer to a higher level of care.     I utilized an evidence-based risk rating tool (CMT) along with my training and experience to weigh the risk of discharge against the risks of further testing, imaging, or hospitalization. At this time, I estimate the risks of additional testing, imaging, or hospitalization to be equal to or greater than the risk of discharge(in the case of discharge home).      The patient's HEART Score is 2. In rare cases, I give patients with HEART Score of 4 the option of discharge, but only when they meet criteria for \"Low 4,\" meaning that HST was used, and the 4 is not from a highly suspicious story, highly suspicious EKG, or positive cardiac enzymes.  In

## 2025-07-10 NOTE — DISCHARGE INSTRUCTIONS
Please avoid all spicy and greasy foods.  Please start taking the Pepcid daily.  Please stop taking the Prevacid.  Please follow-up your family doctor